# Patient Record
Sex: MALE | Race: WHITE | NOT HISPANIC OR LATINO | URBAN - METROPOLITAN AREA
[De-identification: names, ages, dates, MRNs, and addresses within clinical notes are randomized per-mention and may not be internally consistent; named-entity substitution may affect disease eponyms.]

---

## 2018-05-30 ENCOUNTER — EMERGENCY (EMERGENCY)
Facility: HOSPITAL | Age: 58
LOS: 1 days | Discharge: HOME | End: 2018-05-30
Attending: STUDENT IN AN ORGANIZED HEALTH CARE EDUCATION/TRAINING PROGRAM
Payer: MEDICAID

## 2018-05-30 VITALS
DIASTOLIC BLOOD PRESSURE: 68 MMHG | TEMPERATURE: 100 F | OXYGEN SATURATION: 98 % | RESPIRATION RATE: 18 BRPM | SYSTOLIC BLOOD PRESSURE: 138 MMHG | HEART RATE: 87 BPM

## 2018-05-30 VITALS — HEIGHT: 73 IN | WEIGHT: 199.96 LBS

## 2018-05-30 LAB
ALBUMIN SERPL ELPH-MCNC: 4 G/DL — SIGNIFICANT CHANGE UP (ref 3.3–5.2)
ALP SERPL-CCNC: 73 U/L — SIGNIFICANT CHANGE UP (ref 40–120)
ALT FLD-CCNC: 17 U/L — SIGNIFICANT CHANGE UP
ANION GAP SERPL CALC-SCNC: 16 MMOL/L — SIGNIFICANT CHANGE UP (ref 5–17)
APPEARANCE UR: CLEAR — SIGNIFICANT CHANGE UP
AST SERPL-CCNC: 18 U/L — SIGNIFICANT CHANGE UP
BACTERIA # UR AUTO: ABNORMAL
BASOPHILS # BLD AUTO: 0 K/UL — SIGNIFICANT CHANGE UP (ref 0–0.2)
BASOPHILS NFR BLD AUTO: 0.1 % — SIGNIFICANT CHANGE UP (ref 0–2)
BILIRUB SERPL-MCNC: 0.7 MG/DL — SIGNIFICANT CHANGE UP (ref 0.4–2)
BILIRUB UR-MCNC: NEGATIVE — SIGNIFICANT CHANGE UP
BUN SERPL-MCNC: 14 MG/DL — SIGNIFICANT CHANGE UP (ref 8–20)
CALCIUM SERPL-MCNC: 9.1 MG/DL — SIGNIFICANT CHANGE UP (ref 8.6–10.2)
CHLORIDE SERPL-SCNC: 99 MMOL/L — SIGNIFICANT CHANGE UP (ref 98–107)
CO2 SERPL-SCNC: 22 MMOL/L — SIGNIFICANT CHANGE UP (ref 22–29)
COLOR SPEC: YELLOW — SIGNIFICANT CHANGE UP
CREAT SERPL-MCNC: 1.16 MG/DL — SIGNIFICANT CHANGE UP (ref 0.5–1.3)
DIFF PNL FLD: ABNORMAL
EOSINOPHIL # BLD AUTO: 0 K/UL — SIGNIFICANT CHANGE UP (ref 0–0.5)
EOSINOPHIL NFR BLD AUTO: 0.2 % — SIGNIFICANT CHANGE UP (ref 0–6)
EPI CELLS # UR: NEGATIVE — SIGNIFICANT CHANGE UP
GLUCOSE SERPL-MCNC: 117 MG/DL — HIGH (ref 70–115)
GLUCOSE UR QL: NEGATIVE MG/DL — SIGNIFICANT CHANGE UP
HCT VFR BLD CALC: 39.2 % — LOW (ref 42–52)
HGB BLD-MCNC: 12.8 G/DL — LOW (ref 14–18)
KETONES UR-MCNC: NEGATIVE — SIGNIFICANT CHANGE UP
LACTATE BLDV-MCNC: 1.2 MMOL/L — SIGNIFICANT CHANGE UP (ref 0.5–2)
LEUKOCYTE ESTERASE UR-ACNC: ABNORMAL
LYMPHOCYTES # BLD AUTO: 1.7 K/UL — SIGNIFICANT CHANGE UP (ref 1–4.8)
LYMPHOCYTES # BLD AUTO: 11.1 % — LOW (ref 20–55)
MCHC RBC-ENTMCNC: 18.3 PG — LOW (ref 27–31)
MCHC RBC-ENTMCNC: 32.7 G/DL — SIGNIFICANT CHANGE UP (ref 32–36)
MCV RBC AUTO: 56 FL — LOW (ref 80–94)
MONOCYTES # BLD AUTO: 1.1 K/UL — HIGH (ref 0–0.8)
MONOCYTES NFR BLD AUTO: 7.1 % — SIGNIFICANT CHANGE UP (ref 3–10)
NEUTROPHILS # BLD AUTO: 12.3 K/UL — HIGH (ref 1.8–8)
NEUTROPHILS NFR BLD AUTO: 81.2 % — HIGH (ref 37–73)
NITRITE UR-MCNC: NEGATIVE — SIGNIFICANT CHANGE UP
PH UR: 6 — SIGNIFICANT CHANGE UP (ref 5–8)
PLAT MORPH BLD: NORMAL — SIGNIFICANT CHANGE UP
PLATELET # BLD AUTO: 246 K/UL — SIGNIFICANT CHANGE UP (ref 150–400)
POTASSIUM SERPL-MCNC: 3.9 MMOL/L — SIGNIFICANT CHANGE UP (ref 3.5–5.3)
POTASSIUM SERPL-SCNC: 3.9 MMOL/L — SIGNIFICANT CHANGE UP (ref 3.5–5.3)
PROT SERPL-MCNC: 7.7 G/DL — SIGNIFICANT CHANGE UP (ref 6.6–8.7)
PROT UR-MCNC: 100 MG/DL
RBC # BLD: 7 M/UL — HIGH (ref 4.6–6.2)
RBC # FLD: 19.2 % — HIGH (ref 11–15.6)
RBC BLD AUTO: NORMAL — SIGNIFICANT CHANGE UP
RBC CASTS # UR COMP ASSIST: ABNORMAL /HPF (ref 0–4)
SODIUM SERPL-SCNC: 137 MMOL/L — SIGNIFICANT CHANGE UP (ref 135–145)
SP GR SPEC: 1.01 — SIGNIFICANT CHANGE UP (ref 1.01–1.02)
UROBILINOGEN FLD QL: NEGATIVE MG/DL — SIGNIFICANT CHANGE UP
WBC # BLD: 15.2 K/UL — HIGH (ref 4.8–10.8)
WBC # FLD AUTO: 15.2 K/UL — HIGH (ref 4.8–10.8)
WBC UR QL: ABNORMAL

## 2018-05-30 PROCEDURE — 80053 COMPREHEN METABOLIC PANEL: CPT

## 2018-05-30 PROCEDURE — 85027 COMPLETE CBC AUTOMATED: CPT

## 2018-05-30 PROCEDURE — 74177 CT ABD & PELVIS W/CONTRAST: CPT

## 2018-05-30 PROCEDURE — 71045 X-RAY EXAM CHEST 1 VIEW: CPT | Mod: 26

## 2018-05-30 PROCEDURE — 87086 URINE CULTURE/COLONY COUNT: CPT

## 2018-05-30 PROCEDURE — 71045 X-RAY EXAM CHEST 1 VIEW: CPT

## 2018-05-30 PROCEDURE — 36415 COLL VENOUS BLD VENIPUNCTURE: CPT

## 2018-05-30 PROCEDURE — 96374 THER/PROPH/DIAG INJ IV PUSH: CPT | Mod: XU

## 2018-05-30 PROCEDURE — 99291 CRITICAL CARE FIRST HOUR: CPT

## 2018-05-30 PROCEDURE — 87040 BLOOD CULTURE FOR BACTERIA: CPT

## 2018-05-30 PROCEDURE — 74177 CT ABD & PELVIS W/CONTRAST: CPT | Mod: 26

## 2018-05-30 PROCEDURE — 81001 URINALYSIS AUTO W/SCOPE: CPT

## 2018-05-30 PROCEDURE — 83605 ASSAY OF LACTIC ACID: CPT

## 2018-05-30 PROCEDURE — 99284 EMERGENCY DEPT VISIT MOD MDM: CPT | Mod: 25

## 2018-05-30 PROCEDURE — 93010 ELECTROCARDIOGRAM REPORT: CPT

## 2018-05-30 PROCEDURE — 96375 TX/PRO/DX INJ NEW DRUG ADDON: CPT

## 2018-05-30 PROCEDURE — 93005 ELECTROCARDIOGRAM TRACING: CPT

## 2018-05-30 RX ORDER — SODIUM CHLORIDE 9 MG/ML
2700 INJECTION, SOLUTION INTRAVENOUS ONCE
Qty: 0 | Refills: 0 | Status: DISCONTINUED | OUTPATIENT
Start: 2018-05-30 | End: 2018-05-30

## 2018-05-30 RX ORDER — PHENAZOPYRIDINE HCL 100 MG
1 TABLET ORAL
Qty: 6 | Refills: 0
Start: 2018-05-30 | End: 2018-05-31

## 2018-05-30 RX ORDER — OXYCODONE AND ACETAMINOPHEN 5; 325 MG/1; MG/1
1 TABLET ORAL ONCE
Qty: 0 | Refills: 0 | Status: DISCONTINUED | OUTPATIENT
Start: 2018-05-30 | End: 2018-05-30

## 2018-05-30 RX ORDER — SODIUM CHLORIDE 9 MG/ML
3 INJECTION INTRAMUSCULAR; INTRAVENOUS; SUBCUTANEOUS ONCE
Qty: 0 | Refills: 0 | Status: COMPLETED | OUTPATIENT
Start: 2018-05-30 | End: 2018-05-30

## 2018-05-30 RX ORDER — SODIUM CHLORIDE 9 MG/ML
500 INJECTION INTRAMUSCULAR; INTRAVENOUS; SUBCUTANEOUS
Qty: 0 | Refills: 0 | Status: COMPLETED | OUTPATIENT
Start: 2018-05-30 | End: 2018-05-30

## 2018-05-30 RX ORDER — KETOROLAC TROMETHAMINE 30 MG/ML
30 SYRINGE (ML) INJECTION ONCE
Qty: 0 | Refills: 0 | Status: DISCONTINUED | OUTPATIENT
Start: 2018-05-30 | End: 2018-05-30

## 2018-05-30 RX ORDER — PHENAZOPYRIDINE HCL 100 MG
200 TABLET ORAL ONCE
Qty: 0 | Refills: 0 | Status: COMPLETED | OUTPATIENT
Start: 2018-05-30 | End: 2018-05-30

## 2018-05-30 RX ORDER — PIPERACILLIN AND TAZOBACTAM 4; .5 G/20ML; G/20ML
3.38 INJECTION, POWDER, LYOPHILIZED, FOR SOLUTION INTRAVENOUS ONCE
Qty: 0 | Refills: 0 | Status: COMPLETED | OUTPATIENT
Start: 2018-05-30 | End: 2018-05-30

## 2018-05-30 RX ORDER — IBUPROFEN 200 MG
1 TABLET ORAL
Qty: 20 | Refills: 0
Start: 2018-05-30 | End: 2018-06-03

## 2018-05-30 RX ORDER — CEPHALEXIN 500 MG
1 CAPSULE ORAL
Qty: 20 | Refills: 0
Start: 2018-05-30 | End: 2018-06-08

## 2018-05-30 RX ADMIN — Medication 200 MILLIGRAM(S): at 21:23

## 2018-05-30 RX ADMIN — Medication 30 MILLIGRAM(S): at 19:58

## 2018-05-30 RX ADMIN — SODIUM CHLORIDE 2000 MILLILITER(S): 9 INJECTION INTRAMUSCULAR; INTRAVENOUS; SUBCUTANEOUS at 20:55

## 2018-05-30 RX ADMIN — SODIUM CHLORIDE 2000 MILLILITER(S): 9 INJECTION INTRAMUSCULAR; INTRAVENOUS; SUBCUTANEOUS at 22:00

## 2018-05-30 RX ADMIN — OXYCODONE AND ACETAMINOPHEN 1 TABLET(S): 5; 325 TABLET ORAL at 23:29

## 2018-05-30 RX ADMIN — PIPERACILLIN AND TAZOBACTAM 200 GRAM(S): 4; .5 INJECTION, POWDER, LYOPHILIZED, FOR SOLUTION INTRAVENOUS at 19:58

## 2018-05-30 RX ADMIN — SODIUM CHLORIDE 2000 MILLILITER(S): 9 INJECTION INTRAMUSCULAR; INTRAVENOUS; SUBCUTANEOUS at 22:05

## 2018-05-30 RX ADMIN — Medication 30 MILLIGRAM(S): at 19:15

## 2018-05-30 RX ADMIN — SODIUM CHLORIDE 2000 MILLILITER(S): 9 INJECTION INTRAMUSCULAR; INTRAVENOUS; SUBCUTANEOUS at 20:50

## 2018-05-30 RX ADMIN — SODIUM CHLORIDE 3 MILLILITER(S): 9 INJECTION INTRAMUSCULAR; INTRAVENOUS; SUBCUTANEOUS at 19:59

## 2018-05-30 RX ADMIN — SODIUM CHLORIDE 2000 MILLILITER(S): 9 INJECTION INTRAMUSCULAR; INTRAVENOUS; SUBCUTANEOUS at 21:30

## 2018-05-30 RX ADMIN — SODIUM CHLORIDE 2000 MILLILITER(S): 9 INJECTION INTRAMUSCULAR; INTRAVENOUS; SUBCUTANEOUS at 19:59

## 2018-05-30 NOTE — ED PROVIDER NOTE - CHPI ED SYMPTOMS POS
CHILLS/SHORTNESS OF BREATH/+burning urination, rectal pain, bladder pain, blurred vision, neck stiffness/FEVER/COUGH

## 2018-05-30 NOTE — ED PROVIDER NOTE - MEDICAL DECISION MAKING DETAILS
pt with sx concerning for sepsis, code sepsis activated in triage, will evaluate for source of infection, currently concerned cystitis vs diverticulitis

## 2018-05-30 NOTE — ED STATDOCS - ATTENDING CONTRIBUTION TO CARE
I, Orlando Shrestha, performed a face to face bedside interview with this patient regarding history of present illness, review of symptoms and relevant past medical, social and family history.  I completed an independent physical examination. I have communicated the patient’s plan of care and disposition with the ACP.  57 year old with sepsis, code sepsis called and upgraded to main

## 2018-05-30 NOTE — ED PROVIDER NOTE - PROGRESS NOTE DETAILS
Pt is feeling moderately better, but still has some discomfort in rectum,  discussed CT results with pt, pt will be treated for cystitis causing sx

## 2018-05-30 NOTE — ED ADULT NURSE NOTE - OBJECTIVE STATEMENT
Pta&ox3 c/o generalized abd pain, groin pain and neck pain, fever, pt also reports he had 1x episode of SOB where he could not catch his breath. Pt taken to criticalcare in w/c, code sepsis initiated. LBM 1x small BM 5/30/2018. RR even and unlabored, lungs CTA [email or web address]. Wife@ bedside, code team 3 bedside with 1x code team 1 RN. MD Estinfjustin bedside, safety maintained, In no apparent distress@ this time

## 2018-05-30 NOTE — ED PROVIDER NOTE - GASTROINTESTINAL, MLM
Abdomen soft, non-tender, no guarding. Normal bowel sounds present. Rectal exam:  brownish stool in rectal vault, no mass, no edema

## 2018-05-30 NOTE — ED PROVIDER NOTE - CONSTITUTIONAL, MLM
normal... Well appearing, well nourished, awake, alert, oriented to person, place, time/situation and in no obvious distress.

## 2018-05-30 NOTE — ED STATDOCS - PROGRESS NOTE DETAILS
56 yo M, no pertinent PmHX, presents to ED complaining of groin pain with associated chills, fever, and neck stiffness. PT evaluated in Triage, case discussed with Dr. Shrestha, and advised a code sepsis be called and PT be transferred to CDU for higher acuity of care.

## 2018-05-30 NOTE — ED ADULT TRIAGE NOTE - CHIEF COMPLAINT QUOTE
I have rectal  pain and bladder pain. I also have a head ache neck pain and cough as per wife feeling sob

## 2018-05-30 NOTE — ED PROVIDER NOTE - PMH
Detached retina    HLD (hyperlipidemia)    Inguinal hernia    Lumbar herniated disc  5  Umbilical hernia

## 2018-05-30 NOTE — ED PROVIDER NOTE - OBJECTIVE STATEMENT
58 y/o M, with hx of HLD, umbilical hernia, and inguinal hernia, presents to the ED c/o rectal pain, neck stiffness, and bladder pain.  Pt states pain onset last night while he was sleeping.  Pt states pain is constant and worsening.  Pt also c/o fever, cough, SOB, chills, burning urination, and blurred vision, onset earlier today.  Pt states that he has been unable to "catch his breath" all day.  States that he has had several small bowel movements throughout today.  Wife states that they just returned from a trip to the [place] 2 days ago.  Denies nausea, vomiting, or chest pain.  Denies hx of cancer.  NKDA.  PCP: Dr. Castorena.  Code sepsis activated.

## 2018-06-01 ENCOUNTER — EMERGENCY (EMERGENCY)
Facility: HOSPITAL | Age: 58
LOS: 1 days | Discharge: HOME | End: 2018-06-01
Attending: EMERGENCY MEDICINE
Payer: MEDICAID

## 2018-06-01 VITALS
TEMPERATURE: 98 F | HEART RATE: 78 BPM | RESPIRATION RATE: 16 BRPM | SYSTOLIC BLOOD PRESSURE: 135 MMHG | DIASTOLIC BLOOD PRESSURE: 68 MMHG | OXYGEN SATURATION: 98 %

## 2018-06-01 VITALS
TEMPERATURE: 98 F | HEIGHT: 73 IN | HEART RATE: 82 BPM | SYSTOLIC BLOOD PRESSURE: 130 MMHG | DIASTOLIC BLOOD PRESSURE: 75 MMHG | WEIGHT: 199.96 LBS | OXYGEN SATURATION: 98 % | RESPIRATION RATE: 16 BRPM

## 2018-06-01 DIAGNOSIS — Z98.52 VASECTOMY STATUS: Chronic | ICD-10-CM

## 2018-06-01 DIAGNOSIS — Z98.890 OTHER SPECIFIED POSTPROCEDURAL STATES: Chronic | ICD-10-CM

## 2018-06-01 LAB
ANISOCYTOSIS BLD QL: SLIGHT — SIGNIFICANT CHANGE UP
APPEARANCE UR: CLEAR — SIGNIFICANT CHANGE UP
BACTERIA # UR AUTO: ABNORMAL
BILIRUB UR-MCNC: ABNORMAL
COLOR SPEC: ABNORMAL
COMMENT - URINE: SIGNIFICANT CHANGE UP
CULTURE RESULTS: NO GROWTH — SIGNIFICANT CHANGE UP
DIFF PNL FLD: ABNORMAL
EOSINOPHIL NFR BLD AUTO: 1 % — SIGNIFICANT CHANGE UP (ref 0–6)
EPI CELLS # UR: SIGNIFICANT CHANGE UP
GLUCOSE UR QL: NEGATIVE MG/DL — SIGNIFICANT CHANGE UP
HCT VFR BLD CALC: 38.2 % — LOW (ref 42–52)
HGB BLD-MCNC: 12.2 G/DL — LOW (ref 14–18)
HYPOCHROMIA BLD QL: SLIGHT — SIGNIFICANT CHANGE UP
KETONES UR-MCNC: NEGATIVE — SIGNIFICANT CHANGE UP
LEUKOCYTE ESTERASE UR-ACNC: NEGATIVE — SIGNIFICANT CHANGE UP
LYMPHOCYTES # BLD AUTO: 11 % — LOW (ref 20–55)
MACROCYTES BLD QL: SLIGHT — SIGNIFICANT CHANGE UP
MCHC RBC-ENTMCNC: 18.7 PG — LOW (ref 27–31)
MCHC RBC-ENTMCNC: 31.9 G/DL — LOW (ref 32–36)
MCV RBC AUTO: 58.5 FL — LOW (ref 80–94)
MICROCYTES BLD QL: SLIGHT — SIGNIFICANT CHANGE UP
MONOCYTES NFR BLD AUTO: 10 % — SIGNIFICANT CHANGE UP (ref 3–10)
NEUTROPHILS NFR BLD AUTO: 78 % — HIGH (ref 37–73)
NITRITE UR-MCNC: POSITIVE
PH UR: 5 — SIGNIFICANT CHANGE UP (ref 5–8)
PLAT MORPH BLD: NORMAL — SIGNIFICANT CHANGE UP
PLATELET # BLD AUTO: 175 K/UL — SIGNIFICANT CHANGE UP (ref 150–400)
POIKILOCYTOSIS BLD QL AUTO: SLIGHT — SIGNIFICANT CHANGE UP
PROT UR-MCNC: 30 MG/DL
RBC # BLD: 6.53 M/UL — HIGH (ref 4.6–6.2)
RBC # FLD: 16.7 % — HIGH (ref 11–15.6)
RBC BLD AUTO: ABNORMAL
SP GR SPEC: 1.01 — SIGNIFICANT CHANGE UP (ref 1.01–1.02)
SPECIMEN SOURCE: SIGNIFICANT CHANGE UP
UROBILINOGEN FLD QL: 12 MG/DL
WBC # BLD: 13.6 K/UL — HIGH (ref 4.8–10.8)
WBC # FLD AUTO: 13.6 K/UL — HIGH (ref 4.8–10.8)
WBC UR QL: ABNORMAL

## 2018-06-01 PROCEDURE — 99284 EMERGENCY DEPT VISIT MOD MDM: CPT

## 2018-06-01 PROCEDURE — 76870 US EXAM SCROTUM: CPT

## 2018-06-01 PROCEDURE — 74019 RADEX ABDOMEN 2 VIEWS: CPT

## 2018-06-01 PROCEDURE — 74019 RADEX ABDOMEN 2 VIEWS: CPT | Mod: 26

## 2018-06-01 PROCEDURE — 85027 COMPLETE CBC AUTOMATED: CPT

## 2018-06-01 PROCEDURE — 87491 CHLMYD TRACH DNA AMP PROBE: CPT

## 2018-06-01 PROCEDURE — 99284 EMERGENCY DEPT VISIT MOD MDM: CPT | Mod: 25

## 2018-06-01 PROCEDURE — 76870 US EXAM SCROTUM: CPT | Mod: 26

## 2018-06-01 PROCEDURE — 87591 N.GONORRHOEAE DNA AMP PROB: CPT

## 2018-06-01 PROCEDURE — 36415 COLL VENOUS BLD VENIPUNCTURE: CPT

## 2018-06-01 PROCEDURE — 81001 URINALYSIS AUTO W/SCOPE: CPT

## 2018-06-01 PROCEDURE — 96372 THER/PROPH/DIAG INJ SC/IM: CPT

## 2018-06-01 RX ORDER — AZITHROMYCIN 500 MG/1
1000 TABLET, FILM COATED ORAL ONCE
Qty: 0 | Refills: 0 | Status: COMPLETED | OUTPATIENT
Start: 2018-06-01 | End: 2018-06-01

## 2018-06-01 RX ORDER — CEFTRIAXONE 500 MG/1
250 INJECTION, POWDER, FOR SOLUTION INTRAMUSCULAR; INTRAVENOUS ONCE
Qty: 0 | Refills: 0 | Status: COMPLETED | OUTPATIENT
Start: 2018-06-01 | End: 2018-06-01

## 2018-06-01 RX ADMIN — AZITHROMYCIN 1000 MILLIGRAM(S): 500 TABLET, FILM COATED ORAL at 19:03

## 2018-06-01 RX ADMIN — Medication 100 MILLIGRAM(S): at 19:03

## 2018-06-01 RX ADMIN — CEFTRIAXONE 250 MILLIGRAM(S): 500 INJECTION, POWDER, FOR SOLUTION INTRAMUSCULAR; INTRAVENOUS at 19:03

## 2018-06-01 RX ADMIN — Medication 10 MILLIGRAM(S): at 13:29

## 2018-06-01 NOTE — CONSULT NOTE ADULT - SUBJECTIVE AND OBJECTIVE BOX
VASCULAR Urology NOTE  --------------------------------------------------------------------------------------------    Patient is a 57y old  Male who presents with a chief complaint of pain on urination    HPI:  56 y/o M presents with c/o worsening rectal burning and the sensation that his urethra is on fire, + night sweats, febrile at home, no BM x 3 days.  He was here on  and had a complete w/u including Ct scan.  Was sent home on Keflex for UTI and pyridum. returned today to ED for same symptoms. Last colonoscopy 5 years ago which was normal. Has lower back pain, but also has herniated discs and he states pain is hard to determine the source. Denies hematuria, cp, sob, n/v/d, abd paib.     Patient denies  denies lightheadedness/dizziness, denies SOB/chest pain, denies nausea/vomiting, denies constipation/diarrhea.      ROS: 10-system review is otherwise negative except HPI above.      PAST MEDICAL & SURGICAL HISTORY:  Detached retina  History of vasectomy  H/O hernia repair: with mesh    FAMILY HISTORY:    [] Family history not pertinent as reviewed with the patient and family      ALLERGIES: No Known Allergies      CURRENT MEDICATIONS  MEDICATIONS (STANDING):   MEDICATIONS (PRN):  --------------------------------------------------------------------------------------------    Vitals:   T(C): 36.4 (18 @ 13:46), Max: 36.8 (18 @ 11:45)  HR: 88 (18 @ 13:46) (82 - 88)  BP: 145/85 (18 @ 13:46) (130/75 - 145/85)  BP(mean): --  RR: 18 (18 @ 13:46) (16 - 18)  SpO2: 98% (18 @ 13:46) (98% - 98%)  Wt(kg): --  CAPILLARY BLOOD GLUCOSE      Height (cm): 185.42 ( @ 11:45)  Weight (kg): 90.7 ( @ 11:45)  BMI (kg/m2): 26.4 ( @ 11:45)  BSA (m2): 2.15 ( @ 11:45)    PHYSICAL EXAM:   General: NAD, Lying in bed comfortably  Neuro: A+Ox3  HEENT: NC/AT, EOMI  Neck: Soft, supple  Cardio:  nml S1/S2  Resp: Good effort, non-labored, no accessory muscle use  GI/Abd: Soft, NT/ND, no rebound/guarding, no masses palpated  rectal:refused  Skin: Intact, no breakdown  Musculoskeletal: All 4 extremities moving spontaneously, no limitations.   --------------------------------------------------------------------------------------------    LABS  CBC ( @ 12:35)                              12.2<L>                         13.6<H>  )----------------(  175        78.0<H>% Neutrophils, 11.0<L>% Lymphocytes, ANC: --                                  38.2<L>    --------------------------------------------------------------------------------------------    MICROBIOLOGY  Urinalysis ( @ 12:39):     Color: Other<!> / Appearance: Clear / S.015 / pH: 5.0 / Gluc: Negative / Ketones: Negative / Bili: Large<!> / Urobili: 12<!> / Protein :30<!> / Nitrites: Positive<!> / Leuk.Est: Negative / RBC:  / WBC: 6-10<!> / Sq Epi:  / Non Sq Epi: Occasional / Bacteria Few<!>   Orange like color urine    -> .Urine Clean Catch (Midstream) Culture ( @ 22:11)     NG    NG    No growth      --------------------------------------------------------------------------------------------    IMAGING  < from: US Testicles (18 @ 15:31) >  IMPRESSION:       Bilateral hydroceles.  No evidence of epididymoorchitis. No evidence of torsion.  No varicocele.    < end of copied text >

## 2018-06-01 NOTE — ED ADULT NURSE NOTE - OBJECTIVE STATEMENT
pt alert and awake x3, arrived to ED with hematuria, states he was here few days ago dx with a bladder infection, not better,, abd. pain, hematuria pt alert and awake x3, arrived to ED with hematuria, states he was here few days ago dx with a bladder infection, not better,, abd. pain, hematuria.    pt reports being treated for UTI, on pyridium, keflex and motrin. reports symptoms worsening, extreme burning pain while trying to urinate. pt also reports very discolored urine, dark orange. pt c/o rectal pain, inability to expel stool for 3 days, tried a PO laxative with no relief.

## 2018-06-01 NOTE — ED PROVIDER NOTE - ATTENDING CONTRIBUTION TO CARE
seen with acp  here again for perineal pain  elevated wbc count urine culture negative  rectal exam performed by me shows tenderness over prostate with enlargement  ct scan shows periprostatic inflamation  will change antibiotics  obtain urology consult  agree with acps assessment hx and physical

## 2018-06-01 NOTE — ED PROVIDER NOTE - OBJECTIVE STATEMENT
58 y/o M presents with c/o worsening rectal burning and the sensation that his urethra is on fire, + night sweats, no BM x 3 days.  He was here on 5.30 and had a complete w/u including Ct scan.  Was pt on Keflex for UTI, I reviewed the cx and it was negative.

## 2018-06-01 NOTE — ED PROVIDER NOTE - PROGRESS NOTE DETAILS
NP NOTE:  Rectal exam by Dr. Rey + prostate tenderness.  Dr. Lora of Radiology recommends pelvic Ct with thin cuts through prostate. NP NOTE:  Dr. Lora reviewed reconstructed Ct with thin cuts of the prostate, he verbally reports periprostatic inflammation without abscess.  Urology PA at bedside, evaluating patient. NP NOTE:  will add gc, chlamydia and tric as per urology. Will d/c home with rx doxycycline, rocephin and zithromax given here.  F/U Dr. Christian and will refer to GI Dr. Lancaster.

## 2018-06-01 NOTE — CONSULT NOTE ADULT - ASSESSMENT
ASSESSMENT: Patient is a 57y old m with dysuria, perianal pain, fevers.     PLAN:   - no surgical urology intervention needed at this time. Would treat for urethritis with ceftriaxone and doxycycline, pyridium for 3 days. If GC/chlamydia culture is negative, could treat with bactrim.   - CT scan shows periprostatic inflammation. f/u GC, trichomonas cultures.   - would recommend GI consult for perianal pain and no BM for 3 days  - could obtain ID consult if cultures negative and high suspicion for infectious process   - Plan discussed with attending, Dr. Christian

## 2018-06-04 LAB
C TRACH RRNA SPEC QL NAA+PROBE: SIGNIFICANT CHANGE UP
CULTURE RESULTS: SIGNIFICANT CHANGE UP
CULTURE RESULTS: SIGNIFICANT CHANGE UP
N GONORRHOEA RRNA SPEC QL NAA+PROBE: SIGNIFICANT CHANGE UP
SPECIMEN SOURCE: SIGNIFICANT CHANGE UP

## 2018-07-17 PROBLEM — M51.26 OTHER INTERVERTEBRAL DISC DISPLACEMENT, LUMBAR REGION: Chronic | Status: INACTIVE | Noted: 2018-05-30 | Resolved: 2018-06-05

## 2018-07-17 PROBLEM — E78.5 HYPERLIPIDEMIA, UNSPECIFIED: Chronic | Status: INACTIVE | Noted: 2018-05-30 | Resolved: 2018-06-05

## 2018-07-17 PROBLEM — K42.9 UMBILICAL HERNIA WITHOUT OBSTRUCTION OR GANGRENE: Chronic | Status: INACTIVE | Noted: 2018-05-30 | Resolved: 2018-06-05

## 2018-07-17 PROBLEM — K40.90 UNILATERAL INGUINAL HERNIA, WITHOUT OBSTRUCTION OR GANGRENE, NOT SPECIFIED AS RECURRENT: Chronic | Status: INACTIVE | Noted: 2018-05-30 | Resolved: 2018-06-05

## 2019-06-09 ENCOUNTER — EMERGENCY (EMERGENCY)
Facility: HOSPITAL | Age: 59
LOS: 1 days | End: 2019-06-09
Attending: EMERGENCY MEDICINE
Payer: MEDICAID

## 2019-06-09 VITALS
HEART RATE: 67 BPM | DIASTOLIC BLOOD PRESSURE: 88 MMHG | OXYGEN SATURATION: 99 % | WEIGHT: 199.96 LBS | RESPIRATION RATE: 18 BRPM | SYSTOLIC BLOOD PRESSURE: 155 MMHG | TEMPERATURE: 97 F | HEIGHT: 73 IN

## 2019-06-09 DIAGNOSIS — Z98.890 OTHER SPECIFIED POSTPROCEDURAL STATES: Chronic | ICD-10-CM

## 2019-06-09 DIAGNOSIS — Z98.52 VASECTOMY STATUS: Chronic | ICD-10-CM

## 2019-06-09 PROBLEM — K40.90 UNILATERAL INGUINAL HERNIA, WITHOUT OBSTRUCTION OR GANGRENE, NOT SPECIFIED AS RECURRENT: Chronic | Status: ACTIVE | Noted: 2018-06-05

## 2019-06-09 PROBLEM — H33.20 SEROUS RETINAL DETACHMENT, UNSPECIFIED EYE: Chronic | Status: ACTIVE | Noted: 2018-05-30

## 2019-06-09 PROBLEM — M51.26 OTHER INTERVERTEBRAL DISC DISPLACEMENT, LUMBAR REGION: Chronic | Status: ACTIVE | Noted: 2018-06-05

## 2019-06-09 PROBLEM — E78.5 HYPERLIPIDEMIA, UNSPECIFIED: Chronic | Status: ACTIVE | Noted: 2018-06-05

## 2019-06-09 PROBLEM — K42.9 UMBILICAL HERNIA WITHOUT OBSTRUCTION OR GANGRENE: Chronic | Status: ACTIVE | Noted: 2018-06-05

## 2019-06-09 LAB
APPEARANCE UR: CLEAR — SIGNIFICANT CHANGE UP
BACTERIA # UR AUTO: ABNORMAL
BILIRUB UR-MCNC: NEGATIVE — SIGNIFICANT CHANGE UP
COLOR SPEC: YELLOW — SIGNIFICANT CHANGE UP
DIFF PNL FLD: NEGATIVE — SIGNIFICANT CHANGE UP
EPI CELLS # UR: SIGNIFICANT CHANGE UP
GLUCOSE UR QL: NEGATIVE MG/DL — SIGNIFICANT CHANGE UP
KETONES UR-MCNC: NEGATIVE — SIGNIFICANT CHANGE UP
LEUKOCYTE ESTERASE UR-ACNC: NEGATIVE — SIGNIFICANT CHANGE UP
NITRITE UR-MCNC: NEGATIVE — SIGNIFICANT CHANGE UP
PH UR: 5 — SIGNIFICANT CHANGE UP (ref 5–8)
PROT UR-MCNC: 15 MG/DL
RBC CASTS # UR COMP ASSIST: SIGNIFICANT CHANGE UP /HPF (ref 0–4)
SP GR SPEC: 1.01 — SIGNIFICANT CHANGE UP (ref 1.01–1.02)
UROBILINOGEN FLD QL: NEGATIVE MG/DL — SIGNIFICANT CHANGE UP
WBC UR QL: SIGNIFICANT CHANGE UP

## 2019-06-09 PROCEDURE — 99284 EMERGENCY DEPT VISIT MOD MDM: CPT | Mod: 25

## 2019-06-09 PROCEDURE — 74176 CT ABD & PELVIS W/O CONTRAST: CPT | Mod: 26

## 2019-06-09 PROCEDURE — 87086 URINE CULTURE/COLONY COUNT: CPT

## 2019-06-09 PROCEDURE — 74176 CT ABD & PELVIS W/O CONTRAST: CPT

## 2019-06-09 PROCEDURE — 81001 URINALYSIS AUTO W/SCOPE: CPT

## 2019-06-09 PROCEDURE — 96372 THER/PROPH/DIAG INJ SC/IM: CPT

## 2019-06-09 RX ORDER — KETOROLAC TROMETHAMINE 30 MG/ML
30 SYRINGE (ML) INJECTION ONCE
Refills: 0 | Status: DISCONTINUED | OUTPATIENT
Start: 2019-06-09 | End: 2019-06-09

## 2019-06-09 RX ORDER — METHOCARBAMOL 500 MG/1
1500 TABLET, FILM COATED ORAL ONCE
Refills: 0 | Status: COMPLETED | OUTPATIENT
Start: 2019-06-09 | End: 2019-06-09

## 2019-06-09 RX ORDER — ONDANSETRON 8 MG/1
1 TABLET, FILM COATED ORAL
Qty: 9 | Refills: 0
Start: 2019-06-09 | End: 2019-06-11

## 2019-06-09 RX ORDER — OXYCODONE AND ACETAMINOPHEN 5; 325 MG/1; MG/1
1 TABLET ORAL
Qty: 12 | Refills: 0
Start: 2019-06-09 | End: 2019-06-11

## 2019-06-09 RX ORDER — LIDOCAINE 4 G/100G
1 CREAM TOPICAL ONCE
Refills: 0 | Status: COMPLETED | OUTPATIENT
Start: 2019-06-09 | End: 2019-06-09

## 2019-06-09 RX ADMIN — Medication 30 MILLIGRAM(S): at 08:44

## 2019-06-09 RX ADMIN — METHOCARBAMOL 1500 MILLIGRAM(S): 500 TABLET, FILM COATED ORAL at 08:44

## 2019-06-09 RX ADMIN — LIDOCAINE 1 PATCH: 4 CREAM TOPICAL at 12:11

## 2019-06-09 RX ADMIN — Medication 25 MILLIGRAM(S): at 12:08

## 2019-06-09 RX ADMIN — Medication 30 MILLIGRAM(S): at 09:31

## 2019-06-09 NOTE — ED PROVIDER NOTE - CLINICAL SUMMARY MEDICAL DECISION MAKING FREE TEXT BOX
Pt w/ h/o low back pain / herniated discs presenting with acute low back pain after twisting. NO neuro deficits/ urinary retention/ incontinence/ red flags.  Will medicate for pain/ CT stone hunt; reevaluate.

## 2019-06-09 NOTE — ED PROVIDER NOTE - PROGRESS NOTE DETAILS
No worsening symptoms throughout ED stay. I had discussion w/ pt re: outpt f/u with spine for likely MRI;  still no red flags; pt prescribed Percocet after discussion re; opiates for comfort as needed;  Pt says he will f/u. Wife with him. Well for discharge

## 2019-06-09 NOTE — ED PROVIDER NOTE - OBJECTIVE STATEMENT
58yoM with h/o multiple lumbar disc herniations pw L lower back pain onset several days ago in the morning; pt states he thinks he twisted; has been self medicating with alleve/ robaxin with some ' dulling' of pain. Denies any numbness/tingling/weakness/dysuria/hematuria. Pain radiates into the L groin area but not testicle. No fever/chills. States when he pushes hard over the L lower back he feels the pain worsens. Pt has been ambulatory since. Denies any h/o kidney stones. 58yoM with h/o multiple lumbar disc herniations pw L lower back pain onset several days ago in the morning; pt states he thinks he twisted; has been self medicating with alleve/ robaxin with some ' dulling' of pain. Denies any numbness/tingling/weakness/dysuria/hematuria/retention or incontinence. Pain radiates into the L groin area but not testicle. No fever/chills. States when he pushes hard over the L lower back he feels the pain worsens. Pt has been ambulatory since. Denies any h/o kidney stones.

## 2019-06-09 NOTE — ED ADULT NURSE NOTE - OBJECTIVE STATEMENT
Pt arrives to ED c/o left lower back and buttock pain that started Friday. Pt denies injury. states he has hx of back pain but this pain is different. Pain is not reproducible with palpation, states he feels like it is more bone than muscular. Pt A & OX4, evaluated by MD waiting for CT aware of plan of care.

## 2019-06-10 LAB
CULTURE RESULTS: NO GROWTH — SIGNIFICANT CHANGE UP
SPECIMEN SOURCE: SIGNIFICANT CHANGE UP

## 2019-06-11 ENCOUNTER — APPOINTMENT (OUTPATIENT)
Dept: ORTHOPEDIC SURGERY | Facility: CLINIC | Age: 59
End: 2019-06-11
Payer: MEDICARE

## 2019-06-11 VITALS
WEIGHT: 198 LBS | HEIGHT: 73 IN | SYSTOLIC BLOOD PRESSURE: 138 MMHG | DIASTOLIC BLOOD PRESSURE: 84 MMHG | HEART RATE: 70 BPM | BODY MASS INDEX: 26.24 KG/M2

## 2019-06-11 PROBLEM — Z00.00 ENCOUNTER FOR PREVENTIVE HEALTH EXAMINATION: Status: ACTIVE | Noted: 2019-06-11

## 2019-06-11 PROCEDURE — 20552 NJX 1/MLT TRIGGER POINT 1/2: CPT

## 2019-06-11 PROCEDURE — 99204 OFFICE O/P NEW MOD 45 MIN: CPT | Mod: 25

## 2019-06-11 PROCEDURE — 72100 X-RAY EXAM L-S SPINE 2/3 VWS: CPT

## 2019-06-11 RX ORDER — METHYLPREDNISOLONE 4 MG/1
4 TABLET ORAL
Qty: 21 | Refills: 1 | Status: ACTIVE | COMMUNITY
Start: 2019-06-11 | End: 1900-01-01

## 2019-06-11 RX ORDER — KETOROLAC TROMETHAMINE 10 MG/1
10 TABLET, FILM COATED ORAL 3 TIMES DAILY
Qty: 15 | Refills: 0 | Status: ACTIVE | COMMUNITY
Start: 2019-06-11 | End: 1900-01-01

## 2019-06-11 NOTE — DISCUSSION/SUMMARY
[de-identified] : I have recommended that the pt continue with a conservative treatment plan. Pt tolerated the injection given in office today. I will prescribe a Medrol Dose Pack and oral Toradol to provide pain relief. Pt has been referred to physical therapy for decreased pain modalities, core strengthening modalities, soft tissue modalities, and physical modalities. We also spoke about the benefits of using heat, ice, and Bengay cream. The patient will follow up in 2 weeks for a repeat clinical assessment.

## 2019-06-11 NOTE — ADDENDUM
[FreeTextEntry1] : Documented by Mc Villagomez acting as a scribe for Dr. Keith Sinha on 06/11/2019 . All medical record entries made by the Scribe were at my, Dr. Keith Sniha, direction and personally dictated by me on 06/11/2019 . I have reviewed the chart and agree that the record accurately reflects my personal performance of the history, physical exam, assessment and plan. I have also personally directed, reviewed, and agreed with the chart.

## 2019-06-11 NOTE — PROCEDURE
[de-identified] : Verbal consent was obtained and all questions, comments and concerns were addressed. Left lower lumbar / buttock was cleansed with alcohol prep X 3, ethyl chloride was used as local anesthetic. Under sterile precautions 30mg of Ketorolac and 80mg Depo-Medrol were instilled in to the right buttock under sterile precautions. he tolerated procedure well. Dry sterile dressing was placed and patient described relief of pain 5 minutes after procedure was performed.

## 2019-06-11 NOTE — PHYSICAL EXAM
[Poor Appearance] : well-appearing [Acute Distress] : not in acute distress [de-identified] : CONSTITUTIONAL: The patient is a very pleasant individual who is well-nourished and who appears stated age.\par PSYCHIATRIC: The patient is alert and oriented X 3 and in no apparent distress, and participates with orthopedic evaluation well.\par HEAD: Atraumatic and is nonsyndromic in appearance.\par EENT: No visible thyromegaly, EOMI.\par RESPIRATORY: Respiratory rate is regular, not dyspneic on examination.\par LYMPHATICS: There is no inguinal lymphadenopathy\par INTEGUMENTARY: Skin is clean, dry, and intact about the bilateral lower extremities and lumbar spine.\par VASCULAR: There is brisk capillary refill about the bilateral lower extremities.\par NEUROLOGIC: There are no pathologic reflexes. There is no decrease in sensation of the bilateral lower extremities on Wartenberg pinwheel examination. Deep tendon reflexes are well maintained at 2+/4 of the bilateral lower extremities and are symmetric.\par MUSCULOSKELETAL: There is no visible muscular atrophy. Manual motor strength is well maintained in the bilateral lower extremities. Range of motion of lumbar spine is well maintained. The patient ambulates in a non-myelopathic manner. Quad extension, ankle dorsiflexion, EHL, plantar flexion, and ankle eversion are well preserved. Normal secondary orthopaedic exam of bilateral hips, greater trochanteric area, knees and ankles \par \par Negative tension sign and straight leg raise bilaterally. left lower lumbar myositis and trigger point. [de-identified] : CT shows L5-S1 lumbar spondylosis as well as a grade 1 spondylolisthesis of L4 over L5.\par \par X-ray of the lumbar spine taken today shows L4-L5, L5-S1 lumbar spondylosis. Grade 1 spondylolisthesis L4-L5.

## 2019-06-11 NOTE — HISTORY OF PRESENT ILLNESS
[de-identified] : 58 year old M presents with lumbar spine pain. On Friday he twisted and has been in excruciating pain since. Relieved by staying in one position. Immediate pain upon movement. Concentrates his pain to his left lower lumbar, radiates slightly around his left hip / groin. Laying with pillows under his left leg helps. Was at [facility] ED on Sunday, 6/9/19. Hospital gave him Oxycodone and Ibuprofen. He has been using ice multiple times per day, no relief. He has not been to the gym in a few weeks. [Ice] : not relieved by ice [Ataxia] : no ataxia [Incontinence] : no incontinence [Loss of Dexterity] : good dexterity [Urinary Ret.] : no urinary retention

## 2019-06-21 ENCOUNTER — OTHER (OUTPATIENT)
Age: 59
End: 2019-06-21

## 2019-06-25 ENCOUNTER — APPOINTMENT (OUTPATIENT)
Dept: ORTHOPEDIC SURGERY | Facility: CLINIC | Age: 59
End: 2019-06-25
Payer: MEDICARE

## 2019-06-25 VITALS
WEIGHT: 198 LBS | BODY MASS INDEX: 26.24 KG/M2 | HEART RATE: 67 BPM | HEIGHT: 73 IN | DIASTOLIC BLOOD PRESSURE: 92 MMHG | SYSTOLIC BLOOD PRESSURE: 153 MMHG

## 2019-06-25 PROCEDURE — 99214 OFFICE O/P EST MOD 30 MIN: CPT

## 2019-06-25 RX ORDER — OXYCODONE AND ACETAMINOPHEN 5; 325 MG/1; MG/1
5-325 TABLET ORAL
Qty: 12 | Refills: 0 | Status: ACTIVE | COMMUNITY
Start: 2019-06-09

## 2019-06-25 RX ORDER — METHYLPREDNISOLONE 4 MG/1
4 TABLET ORAL
Qty: 21 | Refills: 0 | Status: ACTIVE | COMMUNITY
Start: 2019-06-11

## 2019-06-25 RX ORDER — DOCUSATE SODIUM 100 MG/1
100 CAPSULE, LIQUID FILLED ORAL
Qty: 30 | Refills: 0 | Status: ACTIVE | COMMUNITY
Start: 2019-01-22

## 2019-06-25 RX ORDER — ONDANSETRON 4 MG/1
4 TABLET, ORALLY DISINTEGRATING ORAL
Qty: 9 | Refills: 0 | Status: ACTIVE | COMMUNITY
Start: 2019-06-09

## 2019-06-25 RX ORDER — HYDROCODONE BITARTRATE AND ACETAMINOPHEN 5; 325 MG/1; MG/1
5-325 TABLET ORAL
Qty: 16 | Refills: 0 | Status: ACTIVE | COMMUNITY
Start: 2019-01-22

## 2019-06-25 RX ORDER — CIPROFLOXACIN HYDROCHLORIDE 500 MG/1
500 TABLET, FILM COATED ORAL
Qty: 6 | Refills: 0 | Status: ACTIVE | COMMUNITY
Start: 2019-01-07

## 2019-06-25 RX ORDER — AMOXICILLIN AND CLAVULANATE POTASSIUM 875; 125 MG/1; MG/1
875-125 TABLET, COATED ORAL
Qty: 20 | Refills: 0 | Status: ACTIVE | COMMUNITY
Start: 2019-06-06

## 2019-06-25 NOTE — DISCUSSION/SUMMARY
[de-identified] : he is doing very well with home exercises. I gave him handout on helpful websites particularly [employer] which has very good exercises for her lumbar myositis, spondylosis.  And have repeat Medrol Dosepak in the future if he has an exacerbation but he has had to this year so far so he would like to refrain from that modality at this time. He'll continue proper lifting and bending. We did discuss proper workouts to decrease the occurrence of back pain. He will followup p.r.n.

## 2019-06-25 NOTE — PHYSICAL EXAM
[de-identified] : Lumbar exam:\par CONSTITUTIONAL: The patient is a very pleasant individual who is well-nourished and who appears stated age.\par PSYCHIATRIC: The patient is alert and oriented X 3 and in no apparent distress, and participates with orthopedic evaluation well.\par HEAD: Atraumatic and is nonsyndromic in appearance.\par EENT: No visible thyromegaly, EOMI.\par RESPIRATORY: Respiratory rate is regular, not dyspneic on examination.\par LYMPHATICS: There is no inguinal lymphadenopathy\par INTEGUMENTARY: Skin is clean, dry, and intact about the bilateral lower extremities and lumbar spine.\par VASCULAR: There is brisk capillary refill about the bilateral lower extremities.\par NEUROLOGIC: There are no pathologic reflexes. There is no decrease in sensation of the bilateral lower extremities on Wartenberg pinwheel examination. Deep tendon reflexes are well maintained at 2+/4 of the bilateral lower extremities and are symmetric..\par MUSCULOSKELETAL: There is no visible muscular atrophy. Manual motor strength is well maintained in the bilateral lower extremities. Range of motion of lumbar spine is well maintained. The patient ambulates in a non-myelopathic manner. Negative tension sign and straight leg raise bilaterally. Quad extension, ankle dorsiflexion, EHL, plantar flexion, and ankle eversion are well preserved. Normal secondary orthopaedic exam of bilateral hips, greater trochanteric area, knees and ankles\par mild reproducible left-sided lumbar myositis patient states much better than last visit [de-identified] : No new imaging

## 2019-06-25 NOTE — HISTORY OF PRESENT ILLNESS
[de-identified] : 58 year old M presents with left lower lumbar spine pain.  Relieved by staying in one position. Jamila at [facility] ED on Sunday, 6/9/19. Hospital gave him Oxycodone and Ibuprofen and he has had two medrol dose paks back to back, and felt relief from toradol/trigger shot last visit.  He has not been back to the gym and feels 90 percent resolved.  Has an upcoming trip to [place] with his wife and 20 something-year-old son who is very active.

## 2019-07-15 NOTE — ED ADULT NURSE NOTE - CAS DISCH CONDITION
[facility]  Baltazar Julio M.D.  [address]  [place] [address]  Fax: [phone]   Authorization for Release/Disclosure of   Protected Health Information   Name: KAREN JAUREGUI : 1948 SSN: [id number]   Address: [address] [place] [address]  [place] [address] Phone:    [phone] (work)   I authorize the entity listed below to release/disclose the PHI below to:   [facility]/Baltazar Julio M.D. and Baltazar Julio M.D.   Provider or Entity Name:  [facility]   Address   City, [facility], [facility]   Phone:      Fax:     Reason for request: continuity of care   Information to be released:    [  ] LAST COLONOSCOPY,  including any PATH REPORT and follow-up  [  ] LAST FIT/COLOGUARD RESULT [  ] LAST DEXA  [  ] LAST MAMMOGRAM  [  ] LAST PAP  [ x] LAST LABS [  ] RETINA EXAM REPORT  [  ] IMMUNIZATION RECORDS  [  ] Release all info      [  ] Check here and initial the line next to each item to release ALL health information INCLUDING  _____ Care and treatment for drug and / or alcohol abuse  _____ HIV testing, infection status, or AIDS  _____ Genetic Testing    DATES OF SERVICE OR TIME PERIOD TO BE DISCLOSED: _____________  I understand and acknowledge that:  * This Authorization may be revoked at any time by you in writing, except if your health information has already been used or disclosed.  * Your health information that will be used or disclosed as a result of you signing this authorization could be re-disclosed by the recipient. If this occurs, your re-disclosed health information may no longer be protected by State or Federal laws.  * You may refuse to sign this Authorization. Your refusal will not affect your ability to obtain treatment.  * This Authorization becomes effective upon signing and will  on (date) __________.      If no date is indicated, this Authorization will  one (1) year from the signature date.    Name: Karen Jauregui    Signature:   Date:        7/15/2019       PLEASE FAX REQUESTED RECORDS BACK TO: [phone]   Improved

## 2019-09-20 ENCOUNTER — APPOINTMENT (OUTPATIENT)
Dept: ORTHOPEDIC SURGERY | Facility: CLINIC | Age: 59
End: 2019-09-20
Payer: MEDICAID

## 2019-09-20 VITALS
HEIGHT: 73 IN | HEART RATE: 84 BPM | BODY MASS INDEX: 26.24 KG/M2 | DIASTOLIC BLOOD PRESSURE: 87 MMHG | WEIGHT: 198 LBS | SYSTOLIC BLOOD PRESSURE: 141 MMHG

## 2019-09-20 PROCEDURE — 20552 NJX 1/MLT TRIGGER POINT 1/2: CPT

## 2019-09-20 PROCEDURE — 99214 OFFICE O/P EST MOD 30 MIN: CPT | Mod: 25

## 2019-09-20 NOTE — DISCUSSION/SUMMARY
[de-identified] : A trigger point injection to the left lower lumbar region was administered in the office today. The patient tolerated the procedure well with no resistance. I have recommended that the pt continue with a conservative treatment plan. Pt has been referred to physical therapy for decreased pain modalities, core strengthening modalities and physical modalities. A lumbar MRI has been ordered secondary to persistent pain and is medically necessary to determine a further treatment plan and to evaluate diagnosis of L4-L5. Injection therapy versus spinal surgery. The pt will follow up once MRI has been obtained. At next clinical visit, I would like to obtain a cervical x ray because of c/o muscle atrophy of right hand. We will also start evaluation of cervical spondylosis.

## 2019-09-20 NOTE — ADDENDUM
[FreeTextEntry1] : Documented by Shanta Medel acting as a scribe for Dr. Keith Sinha. 09/20/2019\par \par All medical record entries made by the Scribe were at my, Dr. Keith Sinha, direction and personally dictated by me on 09/20/2019. I have reviewed the chart and agree that the record accurately reflects my personal performance of the history, physical exam, assessment and plan. I have also personally directed, reviewed, and agreed with the chart.

## 2019-09-20 NOTE — PHYSICAL EXAM
[de-identified] : Lumbar exam:\par CONSTITUTIONAL: The patient is a very pleasant individual who is well-nourished and who appears stated age.\par PSYCHIATRIC: The patient is alert and oriented X 3 and in no apparent distress, and participates with orthopedic evaluation well.\par HEAD: Atraumatic and is nonsyndromic in appearance.\par EENT: No visible thyromegaly, EOMI.\par RESPIRATORY: Respiratory rate is regular, not dyspneic on examination.\par LYMPHATICS: There is no inguinal lymphadenopathy\par INTEGUMENTARY: Skin is clean, dry, and intact about the bilateral lower extremities and lumbar spine.\par VASCULAR: There is brisk capillary refill about the bilateral lower extremities.\par NEUROLOGIC: There are no pathologic reflexes. There is no decrease in sensation of the bilateral lower extremities on Wartenberg pinwheel examination. Deep tendon reflexes are well maintained at 2+/4 of the bilateral lower extremities and are symmetric..\par MUSCULOSKELETAL: There is no visible muscular atrophy. Manual motor strength is well maintained in the bilateral lower extremities. Range of motion of lumbar spine is well maintained. The patient ambulates in a non-myelopathic manner. Negative tension sign and straight leg raise bilaterally. Quad extension, ankle dorsiflexion, EHL, plantar flexion, and ankle eversion are well preserved. Normal secondary orthopaedic exam of bilateral hips, greater trochanteric area, knees and ankles\par mild reproducible left-sided lumbar myositis patient states much better than last visit.\par \par Mechanically oriented low back pain. Left cervical muscle atrophy. [de-identified] : CT scan of lumbar spine from [facility] in 06/2019 shows L3-L4 spondylolisthesis and L4-L5 spondylosis.

## 2019-09-20 NOTE — PROCEDURE
[de-identified] : Verbal consent was obtained and all questions, comments and concerns were addressed. After trigger point in the left lower lumbar region was cleansed with alcohol prep X 3, ethyl chloride was used as local anesthetic. Under sterile precautions 1cc of 1 percent lidocaine without epinephrine, plus 10mg depomedrol, were instilled into the affected trigger points. Patient tolerated procedure well. Dry sterile dressing was placed and patient described relief of pain 5 minutes after procedure was performed.\par \par

## 2019-09-20 NOTE — HISTORY OF PRESENT ILLNESS
[de-identified] : 58 year old M presents for follow up of left lower lumbar spine pain. Pt was previously admitted to [facility] ED on 6/9/19 last time his pain was exacerbated. He states his pain has returned and starts at his lower lumbar region and radiates into his left buttock and groin down the leg. Pt denies numbness and tinging. He has not been back to the gym. Pt reports the therapeutic injection he received last time provided relief and he comes in today looking to received another one.  [Incontinence] : no incontinence [Ataxia] : no ataxia [Loss of Dexterity] : good dexterity [Urinary Ret.] : no urinary retention

## 2019-09-20 NOTE — REVIEW OF SYSTEMS
[Joint Pain] : joint pain [Chills] : no chills [Fever] : no fever [Cough] : no cough [SOB on Exertion] : no shortness of breath on exertion

## 2019-09-24 ENCOUNTER — OUTPATIENT (OUTPATIENT)
Dept: OUTPATIENT SERVICES | Facility: HOSPITAL | Age: 59
LOS: 1 days | End: 2019-09-24

## 2019-09-24 ENCOUNTER — APPOINTMENT (OUTPATIENT)
Dept: MRI IMAGING | Facility: CLINIC | Age: 59
End: 2019-09-24
Payer: MEDICAID

## 2019-09-24 DIAGNOSIS — M54.5 LOW BACK PAIN: ICD-10-CM

## 2019-09-24 DIAGNOSIS — Z98.890 OTHER SPECIFIED POSTPROCEDURAL STATES: Chronic | ICD-10-CM

## 2019-09-24 DIAGNOSIS — Z98.52 VASECTOMY STATUS: Chronic | ICD-10-CM

## 2019-09-24 PROCEDURE — 72148 MRI LUMBAR SPINE W/O DYE: CPT | Mod: 26

## 2019-10-14 PROBLEM — M47.812 CERVICAL SPONDYLOSIS: Status: ACTIVE | Noted: 2019-10-14

## 2019-10-15 ENCOUNTER — APPOINTMENT (OUTPATIENT)
Dept: ORTHOPEDIC SURGERY | Facility: CLINIC | Age: 59
End: 2019-10-15
Payer: MEDICAID

## 2019-10-15 VITALS
HEART RATE: 72 BPM | BODY MASS INDEX: 26.24 KG/M2 | DIASTOLIC BLOOD PRESSURE: 90 MMHG | WEIGHT: 198 LBS | SYSTOLIC BLOOD PRESSURE: 150 MMHG | HEIGHT: 73 IN

## 2019-10-15 DIAGNOSIS — M79.10 MYALGIA, UNSPECIFIED SITE: ICD-10-CM

## 2019-10-15 DIAGNOSIS — M54.5 LOW BACK PAIN: ICD-10-CM

## 2019-10-15 DIAGNOSIS — Z86.39 PERSONAL HISTORY OF OTHER ENDOCRINE, NUTRITIONAL AND METABOLIC DISEASE: ICD-10-CM

## 2019-10-15 DIAGNOSIS — M47.816 SPONDYLOSIS W/OUT MYELOPATHY OR RADICULOPATHY, LUMBAR REGION: ICD-10-CM

## 2019-10-15 DIAGNOSIS — M47.812 SPONDYLOSIS W/OUT MYELOPATHY OR RADICULOPATHY, CERVICAL REGION: ICD-10-CM

## 2019-10-15 PROCEDURE — 99214 OFFICE O/P EST MOD 30 MIN: CPT

## 2019-10-15 NOTE — DISCUSSION/SUMMARY
[de-identified] : I have recommended that the pt continue with a conservative treatment plan. Pt has been referred to physical therapy for decreased pain modalities, core strengthening modalities and physical modalities. Pt should return to the gym and was encouraged to do home exercises/ stretching. We also spoke about the benefits of using heat, ice, OTC medications and Bengay cream. Possible injection therapy in the future was also discussed. He may follow up in 6-8 weeks for repeat clinical evaluation.

## 2019-10-15 NOTE — HISTORY OF PRESENT ILLNESS
[de-identified] : 59 year old M presents for  MRI review and follow up of left lower lumbar spine pain. Pt was previously admitted to [facility] ED on 6/9/19 last time his pain was exacerbated. He states his pain has returned and starts at his lower lumbar region and radiates into his left buttock and groin down the leg. Pt denies numbness and tinging. Pt has not returned to the gym or has done home exercises because he state she was guarded about worsening his pain. He states his signs and symptoms has been disturbing his sleep. [Ataxia] : no ataxia [Incontinence] : no incontinence [Loss of Dexterity] : good dexterity [Urinary Ret.] : no urinary retention

## 2019-10-15 NOTE — PHYSICAL EXAM
[de-identified] : Lumbar exam:\par CONSTITUTIONAL: The patient is a very pleasant individual who is well-nourished and who appears stated age.\par PSYCHIATRIC: The patient is alert and oriented X 3 and in no apparent distress, and participates with orthopedic evaluation well.\par HEAD: Atraumatic and is nonsyndromic in appearance.\par EENT: No visible thyromegaly, EOMI.\par RESPIRATORY: Respiratory rate is regular, not dyspneic on examination.\par LYMPHATICS: There is no inguinal lymphadenopathy\par INTEGUMENTARY: Skin is clean, dry, and intact about the bilateral lower extremities and lumbar spine.\par VASCULAR: There is brisk capillary refill about the bilateral lower extremities.\par NEUROLOGIC: There are no pathologic reflexes. There is no decrease in sensation of the bilateral lower extremities on Wartenberg pinwheel examination. Deep tendon reflexes are well maintained at 2+/4 of the bilateral lower extremities and are symmetric..\par MUSCULOSKELETAL: There is no visible muscular atrophy. Manual motor strength is well maintained in the bilateral lower extremities. Range of motion of lumbar spine is well maintained. The patient ambulates in a non-myelopathic manner. Negative tension sign and straight leg raise bilaterally. Quad extension, ankle dorsiflexion, EHL, plantar flexion, and ankle eversion are well preserved. Normal secondary orthopaedic exam of bilateral hips, greater trochanteric area, knees and ankles\par mild reproducible left-sided lumbar myositis patient states much better than last visit.\par \par Mechanically oriented low back pain. Left cervical muscle atrophy. [de-identified] : MRI of the lumbar spine taken at [facility] on 09/24/19 shows multilevel spondylosis , worst at L3-L4. Mild spinal canal narrowing and moderate right and severe left foraminal narrowing with disc contacting the exiting left L3 nerve root. Left lateral recess narrowing and spinal canal narrowing at L4-L5.

## 2019-10-15 NOTE — ADDENDUM
[FreeTextEntry1] : Documented by Shanta Medel acting as a scribe for Dr. Keith Sinha. 10/15/2019\par \par All medical record entries made by the Scribe were at my, Dr. Keith Sinha, direction and personally dictated by me on 10/15/2019. I have reviewed the chart and agree that the record accurately reflects my personal performance of the history, physical exam, assessment and plan. I have also personally directed, reviewed, and agreed with the chart.

## 2022-05-14 ENCOUNTER — EMERGENCY (EMERGENCY)
Facility: HOSPITAL | Age: 62
LOS: 1 days | Discharge: HOME | End: 2022-05-14
Attending: EMERGENCY MEDICINE
Payer: COMMERCIAL

## 2022-05-14 VITALS
SYSTOLIC BLOOD PRESSURE: 158 MMHG | RESPIRATION RATE: 16 BRPM | DIASTOLIC BLOOD PRESSURE: 82 MMHG | HEART RATE: 71 BPM | OXYGEN SATURATION: 98 % | TEMPERATURE: 99 F

## 2022-05-14 VITALS
WEIGHT: 199.96 LBS | HEIGHT: 73 IN | DIASTOLIC BLOOD PRESSURE: 96 MMHG | HEART RATE: 73 BPM | RESPIRATION RATE: 18 BRPM | OXYGEN SATURATION: 98 % | TEMPERATURE: 98 F | SYSTOLIC BLOOD PRESSURE: 186 MMHG

## 2022-05-14 DIAGNOSIS — Z98.890 OTHER SPECIFIED POSTPROCEDURAL STATES: Chronic | ICD-10-CM

## 2022-05-14 DIAGNOSIS — Z98.52 VASECTOMY STATUS: Chronic | ICD-10-CM

## 2022-05-14 LAB
ALBUMIN SERPL ELPH-MCNC: 4.2 G/DL — SIGNIFICANT CHANGE UP (ref 3.3–5.2)
ALP SERPL-CCNC: 82 U/L — SIGNIFICANT CHANGE UP (ref 40–120)
ALT FLD-CCNC: 19 U/L — SIGNIFICANT CHANGE UP
ANION GAP SERPL CALC-SCNC: 13 MMOL/L — SIGNIFICANT CHANGE UP (ref 5–17)
ANISOCYTOSIS BLD QL: SIGNIFICANT CHANGE UP
APPEARANCE UR: CLEAR — SIGNIFICANT CHANGE UP
AST SERPL-CCNC: 21 U/L — SIGNIFICANT CHANGE UP
BACTERIA # UR AUTO: NEGATIVE — SIGNIFICANT CHANGE UP
BASOPHILS # BLD AUTO: 0 K/UL — SIGNIFICANT CHANGE UP (ref 0–0.2)
BASOPHILS NFR BLD AUTO: 0 % — SIGNIFICANT CHANGE UP (ref 0–2)
BILIRUB SERPL-MCNC: 0.6 MG/DL — SIGNIFICANT CHANGE UP (ref 0.4–2)
BILIRUB UR-MCNC: ABNORMAL
BUN SERPL-MCNC: 17.9 MG/DL — SIGNIFICANT CHANGE UP (ref 8–20)
CALCIUM SERPL-MCNC: 9.1 MG/DL — SIGNIFICANT CHANGE UP (ref 8.6–10.2)
CHLORIDE SERPL-SCNC: 103 MMOL/L — SIGNIFICANT CHANGE UP (ref 98–107)
CO2 SERPL-SCNC: 22 MMOL/L — SIGNIFICANT CHANGE UP (ref 22–29)
COLOR SPEC: YELLOW — SIGNIFICANT CHANGE UP
CREAT SERPL-MCNC: 1.78 MG/DL — HIGH (ref 0.5–1.3)
DACRYOCYTES BLD QL SMEAR: SLIGHT — SIGNIFICANT CHANGE UP
DIFF PNL FLD: NEGATIVE — SIGNIFICANT CHANGE UP
EGFR: 42 ML/MIN/1.73M2 — LOW
ELLIPTOCYTES BLD QL SMEAR: SLIGHT — SIGNIFICANT CHANGE UP
EOSINOPHIL # BLD AUTO: 0.18 K/UL — SIGNIFICANT CHANGE UP (ref 0–0.5)
EOSINOPHIL NFR BLD AUTO: 1.7 % — SIGNIFICANT CHANGE UP (ref 0–6)
EPI CELLS # UR: SIGNIFICANT CHANGE UP
GLUCOSE SERPL-MCNC: 139 MG/DL — HIGH (ref 70–99)
GLUCOSE UR QL: NEGATIVE MG/DL — SIGNIFICANT CHANGE UP
HCT VFR BLD CALC: 43.1 % — SIGNIFICANT CHANGE UP (ref 39–50)
HGB BLD-MCNC: 13.2 G/DL — SIGNIFICANT CHANGE UP (ref 13–17)
KETONES UR-MCNC: ABNORMAL
LEUKOCYTE ESTERASE UR-ACNC: NEGATIVE — SIGNIFICANT CHANGE UP
LIDOCAIN IGE QN: 23 U/L — SIGNIFICANT CHANGE UP (ref 22–51)
LYMPHOCYTES # BLD AUTO: 1.18 K/UL — SIGNIFICANT CHANGE UP (ref 1–3.3)
LYMPHOCYTES # BLD AUTO: 11.3 % — LOW (ref 13–44)
MANUAL SMEAR VERIFICATION: SIGNIFICANT CHANGE UP
MCHC RBC-ENTMCNC: 18.8 PG — LOW (ref 27–34)
MCHC RBC-ENTMCNC: 30.6 GM/DL — LOW (ref 32–36)
MCV RBC AUTO: 61.3 FL — LOW (ref 80–100)
MICROCYTES BLD QL: SIGNIFICANT CHANGE UP
MONOCYTES # BLD AUTO: 0.18 K/UL — SIGNIFICANT CHANGE UP (ref 0–0.9)
MONOCYTES NFR BLD AUTO: 1.7 % — LOW (ref 2–14)
NEUTROPHILS # BLD AUTO: 8.51 K/UL — HIGH (ref 1.8–7.4)
NEUTROPHILS NFR BLD AUTO: 80.9 % — HIGH (ref 43–77)
NEUTS BAND # BLD: 0.9 % — SIGNIFICANT CHANGE UP (ref 0–8)
NITRITE UR-MCNC: NEGATIVE — SIGNIFICANT CHANGE UP
OVALOCYTES BLD QL SMEAR: SLIGHT — SIGNIFICANT CHANGE UP
PH UR: 5 — SIGNIFICANT CHANGE UP (ref 5–8)
PLAT MORPH BLD: NORMAL — SIGNIFICANT CHANGE UP
PLATELET # BLD AUTO: 198 K/UL — SIGNIFICANT CHANGE UP (ref 150–400)
POIKILOCYTOSIS BLD QL AUTO: SLIGHT — SIGNIFICANT CHANGE UP
POLYCHROMASIA BLD QL SMEAR: SLIGHT — SIGNIFICANT CHANGE UP
POTASSIUM SERPL-MCNC: 4.4 MMOL/L — SIGNIFICANT CHANGE UP (ref 3.5–5.3)
POTASSIUM SERPL-SCNC: 4.4 MMOL/L — SIGNIFICANT CHANGE UP (ref 3.5–5.3)
PROT SERPL-MCNC: 6.8 G/DL — SIGNIFICANT CHANGE UP (ref 6.6–8.7)
PROT UR-MCNC: 15
RBC # BLD: 7.03 M/UL — HIGH (ref 4.2–5.8)
RBC # FLD: 18.7 % — HIGH (ref 10.3–14.5)
RBC BLD AUTO: ABNORMAL
RBC CASTS # UR COMP ASSIST: NEGATIVE /HPF — SIGNIFICANT CHANGE UP (ref 0–4)
SODIUM SERPL-SCNC: 138 MMOL/L — SIGNIFICANT CHANGE UP (ref 135–145)
SP GR SPEC: 1.02 — SIGNIFICANT CHANGE UP (ref 1.01–1.02)
UROBILINOGEN FLD QL: NEGATIVE MG/DL — SIGNIFICANT CHANGE UP
VARIANT LYMPHS # BLD: 3.5 % — SIGNIFICANT CHANGE UP (ref 0–6)
WBC # BLD: 10.4 K/UL — SIGNIFICANT CHANGE UP (ref 3.8–10.5)
WBC # FLD AUTO: 10.4 K/UL — SIGNIFICANT CHANGE UP (ref 3.8–10.5)
WBC UR QL: NEGATIVE /HPF — SIGNIFICANT CHANGE UP (ref 0–5)

## 2022-05-14 PROCEDURE — 74176 CT ABD & PELVIS W/O CONTRAST: CPT | Mod: MD

## 2022-05-14 PROCEDURE — 74176 CT ABD & PELVIS W/O CONTRAST: CPT | Mod: 26,MD

## 2022-05-14 PROCEDURE — 87086 URINE CULTURE/COLONY COUNT: CPT

## 2022-05-14 PROCEDURE — 80053 COMPREHEN METABOLIC PANEL: CPT

## 2022-05-14 PROCEDURE — 83690 ASSAY OF LIPASE: CPT

## 2022-05-14 PROCEDURE — 85025 COMPLETE CBC W/AUTO DIFF WBC: CPT

## 2022-05-14 PROCEDURE — 36415 COLL VENOUS BLD VENIPUNCTURE: CPT

## 2022-05-14 PROCEDURE — 96374 THER/PROPH/DIAG INJ IV PUSH: CPT

## 2022-05-14 PROCEDURE — 99284 EMERGENCY DEPT VISIT MOD MDM: CPT | Mod: 25

## 2022-05-14 PROCEDURE — 99284 EMERGENCY DEPT VISIT MOD MDM: CPT

## 2022-05-14 PROCEDURE — 81001 URINALYSIS AUTO W/SCOPE: CPT

## 2022-05-14 RX ORDER — SODIUM CHLORIDE 9 MG/ML
1000 INJECTION INTRAMUSCULAR; INTRAVENOUS; SUBCUTANEOUS ONCE
Refills: 0 | Status: COMPLETED | OUTPATIENT
Start: 2022-05-14 | End: 2022-05-14

## 2022-05-14 RX ORDER — KETOROLAC TROMETHAMINE 30 MG/ML
15 SYRINGE (ML) INJECTION ONCE
Refills: 0 | Status: DISCONTINUED | OUTPATIENT
Start: 2022-05-14 | End: 2022-05-14

## 2022-05-14 RX ADMIN — Medication 15 MILLIGRAM(S): at 14:15

## 2022-05-14 RX ADMIN — SODIUM CHLORIDE 1000 MILLILITER(S): 9 INJECTION INTRAMUSCULAR; INTRAVENOUS; SUBCUTANEOUS at 13:16

## 2022-05-14 NOTE — ED STATDOCS - OBJECTIVE STATEMENT
62 y/o male with PMHx of detached retina, HLD, and hernias reports to the ED for back pain.  He reports having intermittent cold sweat, and right sided lower back pain that radiates out to the front. Pt reports nausea as well. Pt states the pain was dull at first, but has gotten worse over time. Pt states he always has general pain but the severeness of pain he is feeling caused him to want to be evaluated.

## 2022-05-14 NOTE — ED STATDOCS - PROGRESS NOTE DETAILS
NICKY HUNTER: PT evaluated by intake physician. HPI/PE/ROS as noted above. Will follow up plan per intake physician pt made aware of resuts and all incidental findings. given copy of reports. instructed on follow up and strict return precautions. pt verbalizes understanding and given opportunity to ask further questions    advised on fu with urologist and PCP, states that he has both and will follow

## 2022-05-14 NOTE — ED STATDOCS - CLINICAL SUMMARY MEDICAL DECISION MAKING FREE TEXT BOX
Pt w sudden onset right flank pain with diaphoresis and mild nausea that has resolved. Brother hx of kidney stones, Mother gallstones. must r/o kidney stones but because pt is not in pain at this time will evaluate.

## 2022-05-14 NOTE — ED STATDOCS - ATTENDING APP SHARED VISIT CONTRIBUTION OF CARE
I, Michelle Cardenas, performed the initial face to face bedside interview with this patient regarding history of present illness, review of symptoms and relevant past medical, social and family history.  I completed an independent physical examination.  I was the initial provider who evaluated this patient. I have signed out the follow up of any pending tests (i.e. labs, radiological studies) to the ACP.  I have communicated the patient’s plan of care and disposition with the ACP.  The history, relevant review of systems, past medical and surgical history, medical decision making, and physical examination was documented by the scribe in my presence and I attest to the accuracy of the documentation.

## 2022-05-14 NOTE — ED STATDOCS - NSICDXPASTMEDICALHX_GEN_ALL_CORE_FT
PAST MEDICAL HISTORY:  Detached retina     HLD (hyperlipidemia)     Inguinal hernia     Lumbar herniated disc 5    Umbilical hernia

## 2022-05-14 NOTE — ED ADULT NURSE NOTE - CAS DISCH BELONGINGS RETURNED
Not applicable Metronidazole Counseling:  I discussed with the patient the risks of metronidazole including but not limited to seizures, nausea/vomiting, a metallic taste in the mouth, nausea/vomiting and severe allergy.

## 2022-05-14 NOTE — ED STATDOCS - NS ED ATTENDING STATEMENT MOD
This was a shared visit with the MARE. I reviewed and verified the documentation and independently performed the documented:

## 2022-05-14 NOTE — ED ADULT NURSE NOTE - OBJECTIVE STATEMENT
Pt reports right sided back pain since yesterday, pain radiates towards the front, denies n/v fevers, urinary symptoms or GI symptoms. Urine appears dark and clear.

## 2022-05-14 NOTE — ED STATDOCS - PATIENT PORTAL LINK FT
You can access the FollowMyHealth Patient Portal offered by [facility] by registering at the following website: [email or web address]. By joining [employer]’s FollowMyHealth portal, you will also be able to view your health information using other applications (apps) compatible with our system.

## 2022-05-14 NOTE — ED ADULT NURSE NOTE - NSIMPLEMENTINTERV_GEN_ALL_ED
Implemented All Universal Safety Interventions:  [place] to call system. Call bell, personal items and telephone within reach. Instruct patient to call for assistance. Room bathroom lighting operational. Non-slip footwear when patient is off stretcher. Physically safe environment: no spills, clutter or unnecessary equipment. Stretcher in lowest position, wheels locked, appropriate side rails in place.

## 2022-05-15 LAB
CULTURE RESULTS: SIGNIFICANT CHANGE UP
SPECIMEN SOURCE: SIGNIFICANT CHANGE UP

## 2023-02-26 ENCOUNTER — INPATIENT (INPATIENT)
Facility: HOSPITAL | Age: 63
LOS: 0 days | Discharge: HOME | DRG: 730 | End: 2023-02-27
Attending: UROLOGY | Admitting: UROLOGY
Payer: COMMERCIAL

## 2023-02-26 VITALS
HEART RATE: 84 BPM | SYSTOLIC BLOOD PRESSURE: 111 MMHG | DIASTOLIC BLOOD PRESSURE: 70 MMHG | TEMPERATURE: 98 F | RESPIRATION RATE: 18 BRPM | OXYGEN SATURATION: 98 %

## 2023-02-26 DIAGNOSIS — S31.31XA LACERATION WITHOUT FOREIGN BODY OF SCROTUM AND TESTES, INITIAL ENCOUNTER: ICD-10-CM

## 2023-02-26 DIAGNOSIS — Z98.52 VASECTOMY STATUS: Chronic | ICD-10-CM

## 2023-02-26 DIAGNOSIS — Z98.890 OTHER SPECIFIED POSTPROCEDURAL STATES: Chronic | ICD-10-CM

## 2023-02-26 LAB
ALBUMIN SERPL ELPH-MCNC: 4.3 G/DL — SIGNIFICANT CHANGE UP (ref 3.3–5.2)
ALP SERPL-CCNC: 96 U/L — SIGNIFICANT CHANGE UP (ref 40–120)
ALT FLD-CCNC: 25 U/L — SIGNIFICANT CHANGE UP
ANION GAP SERPL CALC-SCNC: 11 MMOL/L — SIGNIFICANT CHANGE UP (ref 5–17)
ANISOCYTOSIS BLD QL: SIGNIFICANT CHANGE UP
APTT BLD: 31.5 SEC — SIGNIFICANT CHANGE UP (ref 27.5–35.5)
AST SERPL-CCNC: 31 U/L — SIGNIFICANT CHANGE UP
BASOPHILS # BLD AUTO: 0.03 K/UL — SIGNIFICANT CHANGE UP (ref 0–0.2)
BASOPHILS NFR BLD AUTO: 0.3 % — SIGNIFICANT CHANGE UP (ref 0–2)
BILIRUB SERPL-MCNC: 0.3 MG/DL — LOW (ref 0.4–2)
BLD GP AB SCN SERPL QL: SIGNIFICANT CHANGE UP
BUN SERPL-MCNC: 29.4 MG/DL — HIGH (ref 8–20)
CALCIUM SERPL-MCNC: 8.8 MG/DL — SIGNIFICANT CHANGE UP (ref 8.4–10.5)
CHLORIDE SERPL-SCNC: 104 MMOL/L — SIGNIFICANT CHANGE UP (ref 96–108)
CO2 SERPL-SCNC: 23 MMOL/L — SIGNIFICANT CHANGE UP (ref 22–29)
CREAT SERPL-MCNC: 1.48 MG/DL — HIGH (ref 0.5–1.3)
DACRYOCYTES BLD QL SMEAR: SLIGHT — SIGNIFICANT CHANGE UP
EGFR: 53 ML/MIN/1.73M2 — LOW
ELLIPTOCYTES BLD QL SMEAR: SLIGHT — SIGNIFICANT CHANGE UP
EOSINOPHIL # BLD AUTO: 0.12 K/UL — SIGNIFICANT CHANGE UP (ref 0–0.5)
EOSINOPHIL NFR BLD AUTO: 1.4 % — SIGNIFICANT CHANGE UP (ref 0–6)
FLUAV AG NPH QL: SIGNIFICANT CHANGE UP
FLUBV AG NPH QL: SIGNIFICANT CHANGE UP
GLUCOSE SERPL-MCNC: 117 MG/DL — HIGH (ref 70–99)
HCT VFR BLD CALC: 43.5 % — SIGNIFICANT CHANGE UP (ref 39–50)
HGB BLD-MCNC: 13.3 G/DL — SIGNIFICANT CHANGE UP (ref 13–17)
IMM GRANULOCYTES NFR BLD AUTO: 0.2 % — SIGNIFICANT CHANGE UP (ref 0–0.9)
INR BLD: 0.97 RATIO — SIGNIFICANT CHANGE UP (ref 0.88–1.16)
LYMPHOCYTES # BLD AUTO: 2.32 K/UL — SIGNIFICANT CHANGE UP (ref 1–3.3)
LYMPHOCYTES # BLD AUTO: 26.8 % — SIGNIFICANT CHANGE UP (ref 13–44)
MANUAL SMEAR VERIFICATION: SIGNIFICANT CHANGE UP
MCHC RBC-ENTMCNC: 18.7 PG — LOW (ref 27–34)
MCHC RBC-ENTMCNC: 30.6 GM/DL — LOW (ref 32–36)
MCV RBC AUTO: 61 FL — LOW (ref 80–100)
MICROCYTES BLD QL: SIGNIFICANT CHANGE UP
MONOCYTES # BLD AUTO: 0.54 K/UL — SIGNIFICANT CHANGE UP (ref 0–0.9)
MONOCYTES NFR BLD AUTO: 6.2 % — SIGNIFICANT CHANGE UP (ref 2–14)
NEUTROPHILS # BLD AUTO: 5.62 K/UL — SIGNIFICANT CHANGE UP (ref 1.8–7.4)
NEUTROPHILS NFR BLD AUTO: 65.1 % — SIGNIFICANT CHANGE UP (ref 43–77)
OVALOCYTES BLD QL SMEAR: SLIGHT — SIGNIFICANT CHANGE UP
PLAT MORPH BLD: NORMAL — SIGNIFICANT CHANGE UP
PLATELET # BLD AUTO: 237 K/UL — SIGNIFICANT CHANGE UP (ref 150–400)
POIKILOCYTOSIS BLD QL AUTO: SIGNIFICANT CHANGE UP
POLYCHROMASIA BLD QL SMEAR: SLIGHT — SIGNIFICANT CHANGE UP
POTASSIUM SERPL-MCNC: 4.5 MMOL/L — SIGNIFICANT CHANGE UP (ref 3.5–5.3)
POTASSIUM SERPL-SCNC: 4.5 MMOL/L — SIGNIFICANT CHANGE UP (ref 3.5–5.3)
PROT SERPL-MCNC: 7.2 G/DL — SIGNIFICANT CHANGE UP (ref 6.6–8.7)
PROTHROM AB SERPL-ACNC: 11.3 SEC — SIGNIFICANT CHANGE UP (ref 10.5–13.4)
RBC # BLD: 7.13 M/UL — HIGH (ref 4.2–5.8)
RBC # FLD: 19.6 % — HIGH (ref 10.3–14.5)
RBC BLD AUTO: ABNORMAL
RSV RNA NPH QL NAA+NON-PROBE: SIGNIFICANT CHANGE UP
SARS-COV-2 RNA SPEC QL NAA+PROBE: SIGNIFICANT CHANGE UP
SODIUM SERPL-SCNC: 138 MMOL/L — SIGNIFICANT CHANGE UP (ref 135–145)
TARGETS BLD QL SMEAR: SLIGHT — SIGNIFICANT CHANGE UP
WBC # BLD: 8.65 K/UL — SIGNIFICANT CHANGE UP (ref 3.8–10.5)
WBC # FLD AUTO: 8.65 K/UL — SIGNIFICANT CHANGE UP (ref 3.8–10.5)

## 2023-02-26 PROCEDURE — 99285 EMERGENCY DEPT VISIT HI MDM: CPT

## 2023-02-26 RX ORDER — CEFAZOLIN SODIUM 1 G
1000 VIAL (EA) INJECTION ONCE
Refills: 0 | Status: DISCONTINUED | OUTPATIENT
Start: 2023-02-26 | End: 2023-02-26

## 2023-02-26 RX ORDER — ONDANSETRON 8 MG/1
4 TABLET, FILM COATED ORAL ONCE
Refills: 0 | Status: COMPLETED | OUTPATIENT
Start: 2023-02-26 | End: 2023-02-26

## 2023-02-26 RX ORDER — TETANUS TOXOID, REDUCED DIPHTHERIA TOXOID AND ACELLULAR PERTUSSIS VACCINE, ADSORBED 5; 2.5; 8; 8; 2.5 [IU]/.5ML; [IU]/.5ML; UG/.5ML; UG/.5ML; UG/.5ML
0.5 SUSPENSION INTRAMUSCULAR ONCE
Refills: 0 | Status: COMPLETED | OUTPATIENT
Start: 2023-02-26 | End: 2023-02-26

## 2023-02-26 RX ORDER — IBUPROFEN 200 MG
600 TABLET ORAL ONCE
Refills: 0 | Status: COMPLETED | OUTPATIENT
Start: 2023-02-26 | End: 2023-02-26

## 2023-02-26 RX ORDER — CEFAZOLIN SODIUM 1 G
1000 VIAL (EA) INJECTION ONCE
Refills: 0 | Status: COMPLETED | OUTPATIENT
Start: 2023-02-26 | End: 2023-02-26

## 2023-02-26 RX ORDER — MORPHINE SULFATE 50 MG/1
4 CAPSULE, EXTENDED RELEASE ORAL ONCE
Refills: 0 | Status: DISCONTINUED | OUTPATIENT
Start: 2023-02-26 | End: 2023-02-26

## 2023-02-26 RX ORDER — MORPHINE SULFATE 50 MG/1
8 CAPSULE, EXTENDED RELEASE ORAL ONCE
Refills: 0 | Status: DISCONTINUED | OUTPATIENT
Start: 2023-02-26 | End: 2023-02-26

## 2023-02-26 RX ADMIN — TETANUS TOXOID, REDUCED DIPHTHERIA TOXOID AND ACELLULAR PERTUSSIS VACCINE, ADSORBED 0.5 MILLILITER(S): 5; 2.5; 8; 8; 2.5 SUSPENSION INTRAMUSCULAR at 17:42

## 2023-02-26 RX ADMIN — Medication 600 MILLIGRAM(S): at 17:06

## 2023-02-26 RX ADMIN — Medication 1000 MILLIGRAM(S): at 17:25

## 2023-02-26 RX ADMIN — MORPHINE SULFATE 4 MILLIGRAM(S): 50 CAPSULE, EXTENDED RELEASE ORAL at 17:36

## 2023-02-26 RX ADMIN — MORPHINE SULFATE 8 MILLIGRAM(S): 50 CAPSULE, EXTENDED RELEASE ORAL at 18:28

## 2023-02-26 RX ADMIN — MORPHINE SULFATE 4 MILLIGRAM(S): 50 CAPSULE, EXTENDED RELEASE ORAL at 17:06

## 2023-02-26 RX ADMIN — ONDANSETRON 4 MILLIGRAM(S): 8 TABLET, FILM COATED ORAL at 17:06

## 2023-02-26 RX ADMIN — Medication 600 MILLIGRAM(S): at 17:30

## 2023-02-26 RX ADMIN — MORPHINE SULFATE 8 MILLIGRAM(S): 50 CAPSULE, EXTENDED RELEASE ORAL at 17:43

## 2023-02-26 NOTE — H&P ADULT - HISTORY OF PRESENT ILLNESS
JONEL KIDD  [id number]  62y, Male        Patient is a 62y old  Male who presents with a chief complaint of scrotal injury    HPI:  The patient was working on his saw and somehow is kicked back and injured his scrotum. initial bleeding has stopped. no fevers or chills. no penile issues. no voiding problems.     Allergies    No Known Allergies    Intolerances        MEDICATIONS  (STANDING):  diphtheria/tetanus/pertussis (acellular) Vaccine (Adacel) 0.5 milliLiter(s) IntraMuscular once  morphine  - Injectable 8 milliGRAM(s) IV Push Once    MEDICATIONS  (PRN):      PAST MEDICAL & SURGICAL HISTORY:  Detached retina      HLD (hyperlipidemia)      Inguinal hernia      Umbilical hernia      Lumbar herniated disc  5      H/O hernia repair  with mesh      History of vasectomy      H/O detached retina repair          REVIEW OF SYSTEMS      General: No malaise	    Skin/Breast: no rash  	  Ophthalmologic: right eye blindness  	  ENMT:	 no hearing issues    Respiratory and Thorax: no cough  	  Cardiovascular:	 no arrythmias    Gastrointestinal:	no diarrhea    Genitourinary:	no hematuria    Musculoskeletal: no weakness	       Neurological:	no tremors    Psychiatric:	no depression    Hematology/Lymphatics: no bleeding diathesis	    Endocrine:	no hormonal issues    Allergic/Immunologic: none	    Tob:                              EtOH:    I&O's Detail      PE:    Vital Signs Last 24 Hrs  T(C): 36.5 (26 Feb 2023 16:19), Max: 36.5 (26 Feb 2023 16:19)  T(F): 97.7 (26 Feb 2023 16:19), Max: 97.7 (26 Feb 2023 16:19)  HR: 78 (26 Feb 2023 16:40) (78 - 84)  BP: 153/84 (26 Feb 2023 16:40) (111/70 - 153/84)  BP(mean): --  RR: 22 (26 Feb 2023 16:40) (18 - 22)  SpO2: 100% (26 Feb 2023 16:40) (98% - 100%)    Parameters below as of 26 Feb 2023 16:19  Patient On (Oxygen Delivery Method): room air        Daily     Daily     PHYSICAL EXAM:      Constitutional: appears slightly uncomfortable    Eyes: conjugate gaze    ENMT:NC/AT    Neck: good ROM    Respiratory: no respiratory distress    Cardiovascular: no edema    Gastrointestinal: abdomen flat    Genitourinary: phallus normal.  midline scrotal skin  avulsion.  It is through the dartos but the tunica vaginalis appears intact. Testes normal.     Rectal: not performed    Extremities: good strength    Vascular: skin pink    Neurological: no t remor    Skin: no jaundice    Lymph Nodes:no inguinal nodes    Musculoskeletal: good ROM    Psychiatric: alert and oriented X 4.         Labs:                          13.3   8.65  )-----------( 237      ( 26 Feb 2023 16:35 )             43.5                   Impression:    scrotal injury, skin avulsion    Plan:    I discussed the options with him.  We reviewed short term antibiotics.   I discussed local wound care. I also offered hims the option of seeing plastic surgery for a skin graft for closure. He prefers not to have graft but realizes. he can decide for that later  Wet to dry dressing changes.         Willie Chen MD  02-26-23 @ 17:37

## 2023-02-26 NOTE — ED PROVIDER NOTE - CLINICAL SUMMARY MEDICAL DECISION MAKING FREE TEXT BOX
ASSESSMENT:   JONEL KIDD is a 63yo M who presented w/ midline scrotal laceration. +Pain and minor bleeding. W/o obvious injury to testes themselves.     PLAN: Pain control. Prophylactic abx. Pre op labs. Urology consult. MRI brain without contrast  MRA head and neck without contrast  TTE with bubble study can be done as outpatient  ASA 81 mg PO QD  Lipitor 80 mg PO QHS  DVT prophylaxis with heparin/lovenox MRI brain without contrast  MRA head and neck without contrast  Patient follows with cardiologist Dr. Valentin Pierre  TTE with bubble study can be done as outpatient  Loop recorder may be done as outpatient  ASA 81 mg PO QD  Lipitor 80 mg PO QHS  DVT prophylaxis with heparin/lovenox MRI brain without contrast  MRA head and neck without contrast (renal insufficiency)  Patient follows with cardiologist Dr. Valentin Abrams  TTE with bubble study can be done as outpatient  Loop recorder may be done as outpatient  ASA 81 mg PO QD  Lipitor 80 mg PO QHS  DVT prophylaxis with heparin/lovenox

## 2023-02-26 NOTE — ED ADULT TRIAGE NOTE - CHIEF COMPLAINT QUOTE
pt c/o traumatic open wound to R side of scrotum, pt was working in work shop and cut R side of scrotum, pt c/o pain and swelling @ site. pt brought to [facility] for further assessment.

## 2023-02-26 NOTE — ED ADULT NURSE NOTE - NSICDXPASTSURGICALHX_GEN_ALL_CORE_FT
PAST SURGICAL HISTORY:  H/O hernia repair with mesh    History of vasectomy      PAST SURGICAL HISTORY:  H/O detached retina repair     H/O hernia repair with mesh    History of vasectomy

## 2023-02-26 NOTE — ED PROVIDER NOTE - PHYSICAL EXAMINATION
VITAL SIGNS: I have reviewed vital signs  CONSTITUTIONAL:  in no acute distress.  SKIN: Warm, dry, no rash.  HEAD: Normocephalic, atraumatic.  EYES: PERRL, conjunctiva and sclera clear.  ENT: pink & moist mucosa, no pharyngeal erythema  NECK: Supple, non tender. No cervical lymphadenopathy.  CARD: Regular rate and rhythm. No murmurs.   RESP: No wheezes, rales or rhonchi.   ABD:  soft, non-distended, non-tender.   : +Scrotal laceration 3-4cm w/ small bleeding over the midline. Right testes exposed but w/o bleeding, rupture  MSK:  Good ROM in upper/lower extremities w/o pain.   NEURO: Alert, oriented. Grossly unremarkable. No focal deficits.   PSYCH: Cooperative, alert & oriented x3

## 2023-02-26 NOTE — ED PROVIDER NOTE - ATTENDING CONTRIBUTION TO CARE
Dante: I performed a face to face evaluation of this patient and performed a full history and physical examination on the patient.  I agree with the resident's history, physical examination, and plan of the patient unless otherwise noted. My brief assessment is as follows: hx htn c/o injury to scrotum/testes after table attached to table saw struck pt. saw did not make contact with pt. with bleeding and midline scrotum suture with exposed right teste. no other injury. no a/c. urology consult, prophylactic abx, pain control, reassess

## 2023-02-26 NOTE — ED ADULT NURSE NOTE - OBJECTIVE STATEMENT
62M nad aaox3 c/o pain to scrotum s/p getting it caught on a chop saw table leg. Pt reports the table was tipping over and the leg caught him in the scrotum, lifting him up in the air. Pt noted to have open tear to midline with mild oozing of brb. Pt denies smoking, reports last meal was breakfast this morning and verbalized understanding of not eating or drinking until seen by urology and treatment plan is made. Pt wife is at bedside, vss as charted.

## 2023-02-26 NOTE — ED ADULT NURSE NOTE - NS ED NURSE LEVEL OF CONSCIOUSNESS ORIENTATION
Problem: Safety  Goal: Will remain free from injury  Outcome: PROGRESSING AS EXPECTED  FALLS PRECAUTIONS ONGOING WITH FREQUENT ROUNDING; PATIENT USES CALL LIGHT TO REQUEST ASSISTANCE PRIOR TO ALL TRANSFERS       Oriented - self; Oriented - place; Oriented - time

## 2023-02-26 NOTE — ED PROVIDER NOTE - NS ED ROS FT
Review of Systems  CONSTITUTIONAL: afebrile w/no diaphoresis or weight changes  SKIN: warm, dry w/ no rash or bleeding  EYES: no changes to vision  ENT: no changes in hearing, no sore throat  RESPIRATORY: no cough or SOB  CARDIAC: no chest pain & no palpitations  GI: no abd pain, nausea, vomiting, diarrhea, constipation, or blood in stool/robb blood  GENITO-URINARY: +SCROTAL PAIN, BLEEDING  MUSCULOSKELETAL:  no joint pain, swelling or redness  NEUROLOGIC: no weakness, headache, anesthesia or paresthesias  PSYCH: no anxiety, non suicidal, non homicidal, without hallucinations or depression

## 2023-02-26 NOTE — ED PROVIDER NOTE - OBJECTIVE STATEMENT
JONEL KIDD is a 63yo Male with PMH HTN who presents c/o scrotal pain. States he was working in his garage when his table saw kicked back and struck him in the scrotum. Notes the table hit him and not the saw itself. Pt states his scrotum was cut and c/o pain and bleeding. On exam pt w/ scrotal laceration 3-4cm w/ small bleeding over the midline. Right testes exposed but w/o bleeding, rupture.

## 2023-02-26 NOTE — PATIENT PROFILE ADULT - FALL HARM RISK - RISK INTERVENTIONS

## 2023-02-27 ENCOUNTER — TRANSCRIPTION ENCOUNTER (OUTPATIENT)
Age: 63
End: 2023-02-27

## 2023-02-27 VITALS
SYSTOLIC BLOOD PRESSURE: 154 MMHG | RESPIRATION RATE: 18 BRPM | TEMPERATURE: 98 F | HEART RATE: 79 BPM | DIASTOLIC BLOOD PRESSURE: 75 MMHG | OXYGEN SATURATION: 94 %

## 2023-02-27 LAB
HCV AB S/CO SERPL IA: 0.07 S/CO — SIGNIFICANT CHANGE UP (ref 0–0.99)
HCV AB SERPL-IMP: SIGNIFICANT CHANGE UP

## 2023-02-27 PROCEDURE — 86803 HEPATITIS C AB TEST: CPT

## 2023-02-27 PROCEDURE — 80053 COMPREHEN METABOLIC PANEL: CPT

## 2023-02-27 PROCEDURE — 96374 THER/PROPH/DIAG INJ IV PUSH: CPT

## 2023-02-27 PROCEDURE — 86901 BLOOD TYPING SEROLOGIC RH(D): CPT

## 2023-02-27 PROCEDURE — 86850 RBC ANTIBODY SCREEN: CPT

## 2023-02-27 PROCEDURE — 85610 PROTHROMBIN TIME: CPT

## 2023-02-27 PROCEDURE — 36415 COLL VENOUS BLD VENIPUNCTURE: CPT

## 2023-02-27 PROCEDURE — 86900 BLOOD TYPING SEROLOGIC ABO: CPT

## 2023-02-27 PROCEDURE — 87637 SARSCOV2&INF A&B&RSV AMP PRB: CPT

## 2023-02-27 PROCEDURE — 96375 TX/PRO/DX INJ NEW DRUG ADDON: CPT

## 2023-02-27 PROCEDURE — 85025 COMPLETE CBC W/AUTO DIFF WBC: CPT

## 2023-02-27 PROCEDURE — 90715 TDAP VACCINE 7 YRS/> IM: CPT

## 2023-02-27 PROCEDURE — 85730 THROMBOPLASTIN TIME PARTIAL: CPT

## 2023-02-27 PROCEDURE — 99285 EMERGENCY DEPT VISIT HI MDM: CPT

## 2023-02-27 NOTE — PROGRESS NOTE ADULT - SUBJECTIVE AND OBJECTIVE BOX
Subjective:62yMale admitted with scrotal injury from saw table leg causing trauma, open scrotal wound.  pt resting relatively comfortably at this time, few c/o soreness in scrotum, voiding w/o difficulty.        Vital Signs Last 24 Hrs  T(C): 36.8 (27 Feb 2023 09:28), Max: 36.8 (27 Feb 2023 09:28)  T(F): 98.3 (27 Feb 2023 09:28), Max: 98.3 (27 Feb 2023 09:28)  HR: 70 (27 Feb 2023 09:28) (63 - 84)  BP: 139/79 (27 Feb 2023 09:28) (111/70 - 159/78)  BP(mean): --  RR: 18 (27 Feb 2023 09:28) (18 - 22)  SpO2: 96% (27 Feb 2023 09:28) (96% - 100%)    Parameters below as of 27 Feb 2023 09:28  Patient On (Oxygen Delivery Method): room air      I&O's Detail      Labs:                        13.3   8.65  )-----------( 237      ( 26 Feb 2023 16:35 )             43.5     02-26    138  |  104  |  29.4<H>  ----------------------------<  117<H>  4.5   |  23.0  |  1.48<H>    Ca    8.8      26 Feb 2023 16:35    TPro  7.2  /  Alb  4.3  /  TBili  0.3<L>  /  DBili  x   /  AST  31  /  ALT  25  /  AlkPhos  96  02-26    PT/INR - ( 26 Feb 2023 16:35 )   PT: 11.3 sec;   INR: 0.97 ratio         PTT - ( 26 Feb 2023 16:35 )  PTT:31.5 sec

## 2023-02-27 NOTE — DISCHARGE NOTE PROVIDER - CARE PROVIDER_API CALL
Willie Chen)  Urology  [address], [address]  [place] [address]  Phone: [phone]  Fax: [phone]  Follow Up Time: 1 week

## 2023-02-27 NOTE — DISCHARGE NOTE NURSING/CASE MANAGEMENT/SOCIAL WORK - PATIENT PORTAL LINK FT
You can access the FollowMyHealth Patient Portal offered by [facility] by registering at the following website: [email or web address]. By joining [employer]’s FollowMyHealth portal, you will also be able to view your health information using other applications (apps) compatible with our system.

## 2023-02-27 NOTE — DISCHARGE NOTE PROVIDER - HOSPITAL COURSE
61 yo male presented with injury to his scrotum.  pt states he was closing up a table saw and the leg of the table kicked back and hit him in the scrotum.  The pt sustained and avulsion, open injury to his scrotum midline.  Pt did not want a plastic surgery consult for possible wound management or skin graft.  The dressing was changed on hospital day #1, wound was clean, approx 2.5x2.5cm, xeroform, gauze, abd pad applied.  pt was able to void without difficulty, states he only voids a few times a day. Pt remains afebrile and stable, he is ready for d/c to home.  Pt states his wife will assist with wound care at home.

## 2023-02-27 NOTE — DISCHARGE NOTE NURSING/CASE MANAGEMENT/SOCIAL WORK - NSDCVIVACCINE_GEN_ALL_CORE_FT
Tdap; 26-Feb-2023 17:42; Aleja Castillo ([facility]); [employer]; [id number] (Exp. Date: 08-Dec-2024); IntraMuscular; Deltoid Left.; 0.5 milliLiter(s); VIS (VIS Published: 09-May-2013, VIS Presented: 26-Feb-2023);

## 2023-02-27 NOTE — DISCHARGE NOTE PROVIDER - NSDCCPCAREPLAN_GEN_ALL_CORE_FT
PRINCIPAL DISCHARGE DIAGNOSIS  Diagnosis: Scrotal laceration  Assessment and Plan of Treatment: - apply  bcacitracin, xeroform, gauze to wound 2 x day  - avoid the wound from getting dried out  - you may shower, wash gently, pat dry and reappy dressing  - no baths or hot tubs  - elevate scrotum when lying in bed to help with swelling  - call the office if you develop fever or chills

## 2023-02-27 NOTE — PROGRESS NOTE ADULT - GENITOURINARY COMMENTS
penis nl, scrotum- midline 2.5cmx2.5cm open wound, clean, small amount of oozing from left edge, mild edema of scrotum, tender to palpation

## 2023-02-27 NOTE — PROGRESS NOTE ADULT - ASSESSMENT
63 yo male with trauma to scrotum, open wound  - dressing changed, xeroform, gauze, abd pad applied  - d/c planning for today  - pt states his wife will be able to assist with wound care at home

## 2023-02-27 NOTE — DISCHARGE NOTE NURSING/CASE MANAGEMENT/SOCIAL WORK - NSDCPEFALRISK_GEN_ALL_CORE
For information on Fall & Injury Prevention, visit: [email or web address] OR  [email or web address] OR  https://www.cdc.gov/steadi/patient.html

## 2024-01-31 NOTE — ED ADULT TRIAGE NOTE - PAIN RATING/NUMBER SCALE (0-10): ACTIVITY
US shows no evidence of hernia. She may move up her appointment to be seen to re examine to determine the need for any additional. Imaging or reassurance is all that sneeded. Can't recall what it looked like but the US says no evidence of hernia  8